# Patient Record
Sex: FEMALE | Race: BLACK OR AFRICAN AMERICAN | NOT HISPANIC OR LATINO | URBAN - METROPOLITAN AREA
[De-identification: names, ages, dates, MRNs, and addresses within clinical notes are randomized per-mention and may not be internally consistent; named-entity substitution may affect disease eponyms.]

---

## 2021-01-22 ENCOUNTER — INPATIENT (INPATIENT)
Facility: HOSPITAL | Age: 71
LOS: 2 days | Discharge: HOME | End: 2021-01-25
Attending: INTERNAL MEDICINE | Admitting: INTERNAL MEDICINE
Payer: MEDICARE

## 2021-01-22 VITALS
OXYGEN SATURATION: 97 % | SYSTOLIC BLOOD PRESSURE: 162 MMHG | TEMPERATURE: 99 F | RESPIRATION RATE: 17 BRPM | HEIGHT: 61 IN | DIASTOLIC BLOOD PRESSURE: 82 MMHG | HEART RATE: 92 BPM | WEIGHT: 188.94 LBS

## 2021-01-22 DIAGNOSIS — I10 ESSENTIAL (PRIMARY) HYPERTENSION: ICD-10-CM

## 2021-01-22 DIAGNOSIS — R07.9 CHEST PAIN, UNSPECIFIED: ICD-10-CM

## 2021-01-22 LAB
ALBUMIN SERPL ELPH-MCNC: 3.7 G/DL — SIGNIFICANT CHANGE UP (ref 3.3–5)
ALP SERPL-CCNC: 107 U/L — SIGNIFICANT CHANGE UP (ref 40–120)
ALT FLD-CCNC: 37 U/L — SIGNIFICANT CHANGE UP (ref 12–78)
ANION GAP SERPL CALC-SCNC: 4 MMOL/L — LOW (ref 5–17)
APTT BLD: 38 SEC — HIGH (ref 27.5–35.5)
AST SERPL-CCNC: 29 U/L — SIGNIFICANT CHANGE UP (ref 15–37)
BASOPHILS # BLD AUTO: 0.06 K/UL — SIGNIFICANT CHANGE UP (ref 0–0.2)
BASOPHILS NFR BLD AUTO: 0.8 % — SIGNIFICANT CHANGE UP (ref 0–2)
BILIRUB SERPL-MCNC: 0.3 MG/DL — SIGNIFICANT CHANGE UP (ref 0.2–1.2)
BUN SERPL-MCNC: 11 MG/DL — SIGNIFICANT CHANGE UP (ref 7–23)
CALCIUM SERPL-MCNC: 8.9 MG/DL — SIGNIFICANT CHANGE UP (ref 8.5–10.1)
CHLORIDE SERPL-SCNC: 107 MMOL/L — SIGNIFICANT CHANGE UP (ref 96–108)
CO2 SERPL-SCNC: 28 MMOL/L — SIGNIFICANT CHANGE UP (ref 22–31)
CREAT SERPL-MCNC: 0.84 MG/DL — SIGNIFICANT CHANGE UP (ref 0.5–1.3)
D DIMER BLD IA.RAPID-MCNC: <150 NG/ML DDU — SIGNIFICANT CHANGE UP
EOSINOPHIL # BLD AUTO: 0.1 K/UL — SIGNIFICANT CHANGE UP (ref 0–0.5)
EOSINOPHIL NFR BLD AUTO: 1.3 % — SIGNIFICANT CHANGE UP (ref 0–6)
GLUCOSE SERPL-MCNC: 110 MG/DL — HIGH (ref 70–99)
HCT VFR BLD CALC: 43 % — SIGNIFICANT CHANGE UP (ref 34.5–45)
HGB BLD-MCNC: 14.1 G/DL — SIGNIFICANT CHANGE UP (ref 11.5–15.5)
IMM GRANULOCYTES NFR BLD AUTO: 0.3 % — SIGNIFICANT CHANGE UP (ref 0–1.5)
INR BLD: 1.03 RATIO — SIGNIFICANT CHANGE UP (ref 0.88–1.16)
LIDOCAIN IGE QN: 147 U/L — SIGNIFICANT CHANGE UP (ref 73–393)
LYMPHOCYTES # BLD AUTO: 1.77 K/UL — SIGNIFICANT CHANGE UP (ref 1–3.3)
LYMPHOCYTES # BLD AUTO: 23.2 % — SIGNIFICANT CHANGE UP (ref 13–44)
MAGNESIUM SERPL-MCNC: 2.1 MG/DL — SIGNIFICANT CHANGE UP (ref 1.6–2.6)
MCHC RBC-ENTMCNC: 29.3 PG — SIGNIFICANT CHANGE UP (ref 27–34)
MCHC RBC-ENTMCNC: 32.8 GM/DL — SIGNIFICANT CHANGE UP (ref 32–36)
MCV RBC AUTO: 89.4 FL — SIGNIFICANT CHANGE UP (ref 80–100)
MONOCYTES # BLD AUTO: 0.72 K/UL — SIGNIFICANT CHANGE UP (ref 0–0.9)
MONOCYTES NFR BLD AUTO: 9.4 % — SIGNIFICANT CHANGE UP (ref 2–14)
NEUTROPHILS # BLD AUTO: 4.95 K/UL — SIGNIFICANT CHANGE UP (ref 1.8–7.4)
NEUTROPHILS NFR BLD AUTO: 65 % — SIGNIFICANT CHANGE UP (ref 43–77)
NRBC # BLD: 0 /100 WBCS — SIGNIFICANT CHANGE UP (ref 0–0)
NT-PROBNP SERPL-SCNC: 13 PG/ML — SIGNIFICANT CHANGE UP (ref 0–125)
PLATELET # BLD AUTO: 288 K/UL — SIGNIFICANT CHANGE UP (ref 150–400)
POTASSIUM SERPL-MCNC: 3.7 MMOL/L — SIGNIFICANT CHANGE UP (ref 3.5–5.3)
POTASSIUM SERPL-SCNC: 3.7 MMOL/L — SIGNIFICANT CHANGE UP (ref 3.5–5.3)
PROT SERPL-MCNC: 7.5 GM/DL — SIGNIFICANT CHANGE UP (ref 6–8.3)
PROTHROM AB SERPL-ACNC: 11.9 SEC — SIGNIFICANT CHANGE UP (ref 10.6–13.6)
RBC # BLD: 4.81 M/UL — SIGNIFICANT CHANGE UP (ref 3.8–5.2)
RBC # FLD: 14.1 % — SIGNIFICANT CHANGE UP (ref 10.3–14.5)
SODIUM SERPL-SCNC: 139 MMOL/L — SIGNIFICANT CHANGE UP (ref 135–145)
TROPONIN I SERPL-MCNC: <.015 NG/ML — SIGNIFICANT CHANGE UP (ref 0.01–0.04)
WBC # BLD: 7.62 K/UL — SIGNIFICANT CHANGE UP (ref 3.8–10.5)
WBC # FLD AUTO: 7.62 K/UL — SIGNIFICANT CHANGE UP (ref 3.8–10.5)

## 2021-01-22 PROCEDURE — 71045 X-RAY EXAM CHEST 1 VIEW: CPT | Mod: 26

## 2021-01-22 PROCEDURE — 99222 1ST HOSP IP/OBS MODERATE 55: CPT

## 2021-01-22 PROCEDURE — 99285 EMERGENCY DEPT VISIT HI MDM: CPT

## 2021-01-22 PROCEDURE — 93010 ELECTROCARDIOGRAM REPORT: CPT

## 2021-01-22 RX ORDER — HEPARIN SODIUM 5000 [USP'U]/ML
5000 INJECTION INTRAVENOUS; SUBCUTANEOUS EVERY 12 HOURS
Refills: 0 | Status: DISCONTINUED | OUTPATIENT
Start: 2021-01-22 | End: 2021-01-25

## 2021-01-22 RX ORDER — NITROGLYCERIN 6.5 MG
0.4 CAPSULE, EXTENDED RELEASE ORAL
Refills: 0 | Status: DISCONTINUED | OUTPATIENT
Start: 2021-01-22 | End: 2021-01-23

## 2021-01-22 RX ORDER — LOSARTAN POTASSIUM 100 MG/1
25 TABLET, FILM COATED ORAL DAILY
Refills: 0 | Status: DISCONTINUED | OUTPATIENT
Start: 2021-01-22 | End: 2021-01-25

## 2021-01-22 RX ORDER — ASPIRIN/CALCIUM CARB/MAGNESIUM 324 MG
162 TABLET ORAL DAILY
Refills: 0 | Status: DISCONTINUED | OUTPATIENT
Start: 2021-01-22 | End: 2021-01-23

## 2021-01-22 RX ORDER — ASPIRIN/CALCIUM CARB/MAGNESIUM 324 MG
162 TABLET ORAL ONCE
Refills: 0 | Status: COMPLETED | OUTPATIENT
Start: 2021-01-22 | End: 2021-01-22

## 2021-01-22 RX ADMIN — Medication 162 MILLIGRAM(S): at 21:51

## 2021-01-22 NOTE — H&P ADULT - PROBLEM SELECTOR PLAN 1
Nitroglycerine 0.4mg po q5min x 3 doses maximum PRN for recurrent chest pain  ASA 325mg po given  Troponin level x 2 q 3 hrs     Cardio eval in am

## 2021-01-22 NOTE — ED PROVIDER NOTE - OBJECTIVE STATEMENT
71 yo F hx HTN presenting with complaints of elevated blood pressure and intermittent chest 'discomfort' midsternal, non-radiating, for 3 days. Pain is unrelated to food intake, position and non-exertional. No associated nausea/vomiting/diaphoresis. No focal weakness/numbness/tingling. NO visual changes. No fevers/chills. No history of stress test. No cardiologist. Denies family history of CAD. 71 yo F hx HTN presenting with complaints of elevated blood pressure and intermittent chest 'discomfort' midsternal, non-radiating, for 3 days. Pain is unrelated to food intake, position. Onset is non-specific. No associated nausea/vomiting/diaphoresis. No focal weakness/numbness/tingling. NO visual changes. No fevers/chills. No history of stress test. No cardiologist. Denies family history of CAD.

## 2021-01-22 NOTE — ED PROVIDER NOTE - CLINICAL SUMMARY MEDICAL DECISION MAKING FREE TEXT BOX
69 yo F presenting with non specific chest pain, r/o ACS, r/o PE, labs including trop and d-dimer, EKG, cxr,

## 2021-01-22 NOTE — ED PROVIDER NOTE - PHYSICAL EXAMINATION
VITALS: reviewed  GEN: NAD, A & O x 4  HEAD/EYES: NCAT, EOMI, anicteric sclerae,  ENT: mucus membranes moist, oropharynx WNL, trachea midline, no JVD  RESP: lungs CTA with equal breath sounds bilaterally, chest wall nontender and atraumatic  CV: heart with reg rhythm S1, S2, distal pulses intact and symmetric bilaterally  ABDOMEN: normoactive bowel sounds, soft, nondistended, nontender, no palpable masses  : no CVAT  MSK: extremities atraumatic and nontender, no edema, no asymmetry.   SKIN: warm, dry, no rash, no bruising, no cyanosis. color appropriate for ethnicity  NEURO: alert, mentating appropriately, no facial asymmetry. gross sensation, motor, coordination are intact  PSYCH: Affect appropriate

## 2021-01-22 NOTE — ED ADULT NURSE NOTE - OBJECTIVE STATEMENT
Pt received A&OX4. Pt reports history of hypertension, she states shes been feeling dizzy at home. Highest bp was in the 150s. Denies vision change.

## 2021-01-22 NOTE — H&P ADULT - NSHPLABSRESULTS_GEN_ALL_CORE
Recent Vitals  T(C): 37.2 (01-22-21 @ 19:44), Max: 37.2 (01-22-21 @ 19:44)  HR: 85 (01-22-21 @ 19:52) (85 - 92)  BP: 159/85 (01-22-21 @ 19:52) (159/85 - 162/82)  RR: 17 (01-22-21 @ 19:44) (17 - 17)  SpO2: 97% (01-22-21 @ 19:44) (97% - 97%)                        14.1   7.62  )-----------( 288      ( 22 Jan 2021 21:32 )             43.0     01-22    139  |  107  |  11  ----------------------------<  110<H>  3.7   |  28  |  0.84    Ca    8.9      22 Jan 2021 21:32  Mg     2.1     01-22    TPro  7.5  /  Alb  3.7  /  TBili  0.3  /  DBili  x   /  AST  29  /  ALT  37  /  AlkPhos  107  01-22    PT/INR - ( 22 Jan 2021 21:32 )   PT: 11.9 sec;   INR: 1.03 ratio         PTT - ( 22 Jan 2021 21:32 )  PTT:38.0 sec  LIVER FUNCTIONS - ( 22 Jan 2021 21:32 )  Alb: 3.7 g/dL / Pro: 7.5 gm/dL / ALK PHOS: 107 U/L / ALT: 37 U/L / AST: 29 U/L / GGT: x               Home Medications:  irbesartan 75 mg oral tablet: 1 tab(s) orally once a day (22 Jan 2021 21:35)

## 2021-01-22 NOTE — ED ADULT TRIAGE NOTE - MEANS OF ARRIVAL
TRANSFER - IN REPORT:    Verbal report received from [facility] on [place]  being received from [phone] for ordered procedure      Report consisted of patients Situation, Background, Assessment and   Recommendations(SBAR). Information from the following report(s) SBAR was reviewed with the receiving nurse. Opportunity for questions and clarification was provided. Assessment completed upon patients arrival to unit and care assumed.
ambulatory

## 2021-01-22 NOTE — H&P ADULT - HISTORY OF PRESENT ILLNESS
68F with a PMH of HTN who presents to the ED for chest pain.  Patient states that for the last four days she has had mild substernal chest pain at rest.  Happens sporadically, improves without intervention.  Patient states that she also feels some lightheadedness associated with the pain but denies palpitations, diaphroesis, or dizziness.  Denies radiation of the pain.  Patient denies smoking, drinking history.  Allergic to PCN.  Patient has no other complaints.  Referred to the ED by her PMD.  Vitals stable, labs benign.  EKG shows T wave flattening.  Will admit to tele.

## 2021-01-22 NOTE — H&P ADULT - NSHPREVIEWOFSYSTEMS_GEN_ALL_CORE
Constitutional: no fever, chills, night sweats  Ears: no hearing changes or ear pain,   Nose: no nasal congestion, sinus pain, or rhinorrhea  Cardio: chest pain positive.  No orthopnea, edema, or palpitations  Resp: no dyspnea, cough, wheezing  GI: no nausea, vomiting, diarrhea, constipation, hematochezia, or melena  : no dysuria, urinary frequency, hematuria  MSK: no back pain, neck pain  Skin: no rash, pruritis   Neuro: dizziness positive.  No weakness, lightheadedness, syncope   Heme/Lymph: no bruising or bleeding

## 2021-01-23 LAB
FLUAV AG NPH QL: SIGNIFICANT CHANGE UP COUNTS
FLUBV AG NPH QL: SIGNIFICANT CHANGE UP COUNTS
HCV AB S/CO SERPL IA: 0.07 S/CO — SIGNIFICANT CHANGE UP (ref 0–0.99)
HCV AB SERPL-IMP: SIGNIFICANT CHANGE UP
RSV RNA NPH QL NAA+NON-PROBE: SIGNIFICANT CHANGE UP COUNTS
SARS-COV-2 IGG SERPL QL IA: NEGATIVE — SIGNIFICANT CHANGE UP
SARS-COV-2 IGM SERPL IA-ACNC: 0.07 INDEX — SIGNIFICANT CHANGE UP
SARS-COV-2 RNA SPEC QL NAA+PROBE: SIGNIFICANT CHANGE UP COUNTS
TROPONIN I SERPL-MCNC: <.015 NG/ML — SIGNIFICANT CHANGE UP (ref 0.01–0.04)
TROPONIN I SERPL-MCNC: <.015 NG/ML — SIGNIFICANT CHANGE UP (ref 0.01–0.04)

## 2021-01-23 PROCEDURE — 93306 TTE W/DOPPLER COMPLETE: CPT | Mod: 26

## 2021-01-23 PROCEDURE — 99232 SBSQ HOSP IP/OBS MODERATE 35: CPT

## 2021-01-23 PROCEDURE — 99223 1ST HOSP IP/OBS HIGH 75: CPT

## 2021-01-23 RX ORDER — ASPIRIN/CALCIUM CARB/MAGNESIUM 324 MG
81 TABLET ORAL DAILY
Refills: 0 | Status: DISCONTINUED | OUTPATIENT
Start: 2021-01-23 | End: 2021-01-25

## 2021-01-23 RX ORDER — SIMVASTATIN 20 MG/1
10 TABLET, FILM COATED ORAL AT BEDTIME
Refills: 0 | Status: DISCONTINUED | OUTPATIENT
Start: 2021-01-23 | End: 2021-01-25

## 2021-01-23 RX ADMIN — HEPARIN SODIUM 5000 UNIT(S): 5000 INJECTION INTRAVENOUS; SUBCUTANEOUS at 01:10

## 2021-01-23 RX ADMIN — LOSARTAN POTASSIUM 25 MILLIGRAM(S): 100 TABLET, FILM COATED ORAL at 01:10

## 2021-01-23 RX ADMIN — SIMVASTATIN 10 MILLIGRAM(S): 20 TABLET, FILM COATED ORAL at 22:24

## 2021-01-23 RX ADMIN — HEPARIN SODIUM 5000 UNIT(S): 5000 INJECTION INTRAVENOUS; SUBCUTANEOUS at 17:36

## 2021-01-23 RX ADMIN — Medication 162 MILLIGRAM(S): at 01:10

## 2021-01-23 RX ADMIN — Medication 81 MILLIGRAM(S): at 12:08

## 2021-01-23 NOTE — PATIENT PROFILE ADULT - VISION (WITH CORRECTIVE LENSES IF THE PATIENT USUALLY WEARS THEM):
One pair of reading glasses/Partially impaired: cannot see medication labels or newsprint, but can see obstacles in path, and the surrounding layout; can count fingers at arm's length

## 2021-01-23 NOTE — CONSULT NOTE ADULT - SUBJECTIVE AND OBJECTIVE BOX
Cardiology Initial Consult    [facility] - Cardiology at [place]  [address] [place] [address], [place] [address]  Office: [phone]  Fax: [phone]    All cardiology service information can be found on [email or web address], password: [email or web address]    CHIEF COMPLAINT:      HISTORY OF PRESENT ILLNESS:  70yFemale    Allergies    penicillin (Hives)    Intolerances    	    MEDICATIONS:  aspirin enteric coated 162 milliGRAM(s) Oral daily  heparin   Injectable 5000 Unit(s) SubCutaneous every 12 hours  losartan 25 milliGRAM(s) Oral daily  nitroglycerin     SubLingual 0.4 milliGRAM(s) SubLingual every 5 minutes PRN                  PAST MEDICAL & SURGICAL HISTORY:  Essential hypertension    No significant past surgical history        FAMILY HISTORY:      SOCIAL HISTORY:    [ ] Non-smoker  [ ] Smoker  [ ] Alcohol    Review of Systems:  Constitutional: [ ] Fever [ ] Chills [ ] Fatigue [ ] Weight change   HEENT: [ ] Blurred vision [ ] Eye pain [ ] Headache [ ] Runny nose [ ] Sore throat   Respiratory: [ ] Cough [ ] Wheezing [ ] Shortness of breath  Cardiovascular: [ ] Chest Pain [ ] Palpitations [ ] MARR [ ] PND [ ] Orthopnea  Gastrointestinal: [ ] Abdominal Pain [ ] Diarrhea [ ] Constipation [ ] Hemorrhoids [ ] Nausea [ ] Vomiting  Genitourinary: [ ] Nocturia [ ] Dysuria [ ] Incontinence  Extremities: [ ] Swelling [ ] Joint Pain  Neurologic: [ ] Focal deficit [ ] Paresthesias [ ] Syncope  Skin: [ ] Rash [ ] Ecchymoses [ ] Wounds [ ] Lesions  Psychiatry: [ ] Depression [ ] Suicidal/Homicidal ideation [ ] Anxiety [ ] Sleep disturbances  [ ] 10 point review of systems is otherwise negative except as mentioned above            [ ]Unable to obtain    PHYSICAL EXAM:  T(C): 37.1 (01-23-21 @ 04:00), Max: 37.2 (01-22-21 @ 19:44)  HR: 71 (01-23-21 @ 04:00) (70 - 92)  BP: 131/69 (01-23-21 @ 04:00) (131/69 - 162/82)  RR: 16 (01-23-21 @ 04:00) (15 - 18)  SpO2: 98% (01-23-21 @ 04:00) (95% - 98%)  Wt(kg): --  I&O's Summary      Appearance: No acute distress  HEENT:   Normal oral mucosa, PERRL  Cardiovascular: Normal S1 S2, no elevated JVP, no murmurs, no edema  Respiratory: Lungs clear to auscultation	, good air movement  Psychiatry: A & O x 3, Mood & affect appropriate  Gastrointestinal:  soft nt nd normoactive bs	  Skin: No rashes, no ecchymoses, no cyanosis	  Neurologic: grossly non-focal  Extremities: Normal range of motion, no clubbing, cyanosis or edema  Vascular: Peripheral pulses palpable bilaterally    LABS:	 	  CBC Full  -  ( 22 Jan 2021 21:32 )  WBC Count : 7.62 K/uL  Hemoglobin : 14.1 g/dL  Hematocrit : 43.0 %  Platelet Count - Automated : 288 K/uL  Mean Cell Volume : 89.4 fl  Mean Cell Hemoglobin : 29.3 pg  Mean Cell Hemoglobin Concentration : 32.8 gm/dL  Auto Neutrophil # : 4.95 K/uL  Auto Lymphocyte # : 1.77 K/uL  Auto Monocyte # : 0.72 K/uL  Auto Eosinophil # : 0.10 K/uL  Auto Basophil # : 0.06 K/uL  Auto Neutrophil % : 65.0 %  Auto Lymphocyte % : 23.2 %  Auto Monocyte % : 9.4 %  Auto Eosinophil % : 1.3 %  Auto Basophil % : 0.8 %    01-22    139  |  107  |  11  ----------------------------<  110<H>  3.7   |  28  |  0.84    Ca    8.9      22 Jan 2021 21:32  Mg     2.1     01-22    TPro  7.5  /  Alb  3.7  /  TBili  0.3  /  DBili  x   /  AST  29  /  ALT  37  /  AlkPhos  107  01-22      proBNP: Serum Pro-Brain Natriuretic Peptide: 13 pg/mL (01-22 @ 21:32)    Lipid Profile:   HgA1c:   TSH:     CARDIAC MARKERS:  Troponin I, Serum: <.015 ng/mL (01-23 @ 05:02)  Troponin I, Serum: <.015 ng/mL (01-23 @ 00:48)  Troponin I, Serum: <.015 ng/mL (01-22 @ 21:32)              TELEMETRY: 	    ECG:  	  RADIOLOGY:  OTHER: 	    PREVIOUS DIAGNOSTIC TESTING:    [ ] Echocardiogram:   [ ] Catheterization:   [ ] Stress Test:  	 Cardiology Initial Consult    [facility] - Cardiology at [place]  [address] [place] [address], [place] [address]  Office: [phone]  Fax: [phone]    CHIEF COMPLAINT: chest pain      HISTORY OF PRESENT ILLNESS:  70F HTN with hx of several days of intermittent chest pain. Not positional, though does report some mild dizziness or feeling of unwell. Some increase in MARR.  Currently asymptomatic.    ECG with nonspecific t wave flattening.  She has no hx of similar symptoms.      Allergies    penicillin (Hives)  Intolerances      MEDICATIONS:  aspirin enteric coated 162 milliGRAM(s) Oral daily  heparin   Injectable 5000 Unit(s) SubCutaneous every 12 hours  losartan 25 milliGRAM(s) Oral daily  nitroglycerin     SubLingual 0.4 milliGRAM(s) SubLingual every 5 minutes PRN      PAST MEDICAL & SURGICAL HISTORY:  Essential hypertension    No significant past surgical history    FAMILY HISTORY:    SOCIAL HISTORY:    [ x] Non-smoker  [ ] Smoker  [ ] Alcohol    Review of Systems:  Constitutional: [- ] Fever [- ] Chills [ ] Fatigue [ ] Weight change   HEENT: [ -] Blurred vision [ -] Eye pain [ ] Headache [ ] Runny nose [ ] Sore throat   Respiratory: [- ] Cough [ ] Wheezing [ ] Shortness of breath  Cardiovascular: [x ] Chest Pain [ ] Palpitations [x ] MARR [ ] PND [- ] Orthopnea  Gastrointestinal: [- ] Abdominal Pain [ -] Diarrhea [ ] Constipation [ ] Hemorrhoids [ ] Nausea [ ] Vomiting  Genitourinary: [ -] Nocturia [ ] Dysuria [ ] Incontinence  Extremities: [ -] Swelling [ ] Joint Pain  Neurologic: [- ] Focal deficit [ ] Paresthesias [ ] Syncope  Skin: [- ] Rash [ ] Ecchymoses [ ] Wounds [ ] Lesions  Psychiatry: [ -] Depression [ ] Suicidal/Homicidal ideation [ ] Anxiety [ ] Sleep disturbances  [x ] 10 point review of systems is otherwise negative except as mentioned above            [ ]Unable to obtain    PHYSICAL EXAM:  T(C): 37.1 (01-23-21 @ 04:00), Max: 37.2 (01-22-21 @ 19:44)  HR: 71 (01-23-21 @ 04:00) (70 - 92)  BP: 131/69 (01-23-21 @ 04:00) (131/69 - 162/82)  RR: 16 (01-23-21 @ 04:00) (15 - 18)  SpO2: 98% (01-23-21 @ 04:00) (95% - 98%)  Wt(kg): --  I&O's Summary      Appearance: No acute distress  HEENT:   mmm  Cardiovascular: Normal S1 S2, 1/6 systolic murmur RUSB, no elevated JVP,no edema  Respiratory: Lungs clear to auscultation	, good air movement  Psychiatry: A & O x 3, Mood & affect appropriate  Gastrointestinal:  soft nt nd normoactive bs	  Skin: No rashes, no ecchymoses, no cyanosis	  Neurologic: grossly non-focal  Extremities: Normal range of motion, no clubbing, cyanosis or edema  Vascular: Peripheral pulses palpable bilaterally    LABS:	 	  CBC Full  -  ( 22 Jan 2021 21:32 )  WBC Count : 7.62 K/uL  Hemoglobin : 14.1 g/dL  Hematocrit : 43.0 %  Platelet Count - Automated : 288 K/uL      01-22  139  |  107  |  11  ----------------------------<  110<H>  3.7   |  28  |  0.84    Ca    8.9      22 Jan 2021 21:32  Mg     2.1     01-22    TPro  7.5  /  Alb  3.7  /  TBili  0.3  /  DBili  x   /  AST  29  /  ALT  37  /  AlkPhos  107  01-22    proBNP: Serum Pro-Brain Natriuretic Peptide: 13 pg/mL (01-22 @ 21:32)    Lipid Profile:   HgA1c:   TSH:     CARDIAC MARKERS:  Troponin I, Serum: <.015 ng/mL (01-23 @ 05:02)  Troponin I, Serum: <.015 ng/mL (01-23 @ 00:48)  Troponin I, Serum: <.015 ng/mL (01-22 @ 21:32)      TELEMETRY: 	    ECG:  	SR, non spec t wave flattening  RADIOLOGY:  OTHER: 	    PREVIOUS DIAGNOSTIC TESTING:    [ ] Echocardiogram:    [ ] Catheterization:   [ ] Stress Test:

## 2021-01-23 NOTE — CONSULT NOTE ADULT - ASSESSMENT
70F HTN with atypical chest pain however with some features concerning for possible CAD. Her BP was elevated during intake. Cardiac risk enhancers include hx of HTN, age, and postmenopausal state. Differential also includes a cardiac arrhythmia leading to dizziness and MARR.    -check TTE  -check nuclear stress test  -cont ARB for afterload reduction    discussed with pt who agrees with the plan

## 2021-01-23 NOTE — PATIENT PROFILE ADULT - STATED REASON FOR ADMISSION
I was feeling lightheaded and my blood pressure was high for several days. I started hasving pain in my back and chest.

## 2021-01-23 NOTE — ED ADULT NURSE REASSESSMENT NOTE - NS ED NURSE REASSESS COMMENT FT1
covering primary nurse Felipa. pt on the bed alert and oriented. pt identified self introduced. pt for admission. safety measures checked.

## 2021-01-24 LAB
ANION GAP SERPL CALC-SCNC: 8 MMOL/L — SIGNIFICANT CHANGE UP (ref 5–17)
BUN SERPL-MCNC: 11 MG/DL — SIGNIFICANT CHANGE UP (ref 7–23)
CALCIUM SERPL-MCNC: 8.8 MG/DL — SIGNIFICANT CHANGE UP (ref 8.5–10.1)
CHLORIDE SERPL-SCNC: 106 MMOL/L — SIGNIFICANT CHANGE UP (ref 96–108)
CHOLEST SERPL-MCNC: 153 MG/DL — SIGNIFICANT CHANGE UP
CO2 SERPL-SCNC: 26 MMOL/L — SIGNIFICANT CHANGE UP (ref 22–31)
CREAT SERPL-MCNC: 0.73 MG/DL — SIGNIFICANT CHANGE UP (ref 0.5–1.3)
GLUCOSE SERPL-MCNC: 83 MG/DL — SIGNIFICANT CHANGE UP (ref 70–99)
HCT VFR BLD CALC: 40.6 % — SIGNIFICANT CHANGE UP (ref 34.5–45)
HDLC SERPL-MCNC: 46 MG/DL — LOW
HGB BLD-MCNC: 13.4 G/DL — SIGNIFICANT CHANGE UP (ref 11.5–15.5)
LIPID PNL WITH DIRECT LDL SERPL: 92 MG/DL — SIGNIFICANT CHANGE UP
MCHC RBC-ENTMCNC: 29.5 PG — SIGNIFICANT CHANGE UP (ref 27–34)
MCHC RBC-ENTMCNC: 33 GM/DL — SIGNIFICANT CHANGE UP (ref 32–36)
MCV RBC AUTO: 89.4 FL — SIGNIFICANT CHANGE UP (ref 80–100)
NON HDL CHOLESTEROL: 107 MG/DL — SIGNIFICANT CHANGE UP
NRBC # BLD: 0 /100 WBCS — SIGNIFICANT CHANGE UP (ref 0–0)
PLATELET # BLD AUTO: 269 K/UL — SIGNIFICANT CHANGE UP (ref 150–400)
POTASSIUM SERPL-MCNC: 3.8 MMOL/L — SIGNIFICANT CHANGE UP (ref 3.5–5.3)
POTASSIUM SERPL-SCNC: 3.8 MMOL/L — SIGNIFICANT CHANGE UP (ref 3.5–5.3)
RBC # BLD: 4.54 M/UL — SIGNIFICANT CHANGE UP (ref 3.8–5.2)
RBC # FLD: 14.1 % — SIGNIFICANT CHANGE UP (ref 10.3–14.5)
SODIUM SERPL-SCNC: 140 MMOL/L — SIGNIFICANT CHANGE UP (ref 135–145)
TRIGL SERPL-MCNC: 74 MG/DL — SIGNIFICANT CHANGE UP
WBC # BLD: 5.98 K/UL — SIGNIFICANT CHANGE UP (ref 3.8–10.5)
WBC # FLD AUTO: 5.98 K/UL — SIGNIFICANT CHANGE UP (ref 3.8–10.5)

## 2021-01-24 PROCEDURE — 99232 SBSQ HOSP IP/OBS MODERATE 35: CPT

## 2021-01-24 RX ADMIN — SIMVASTATIN 10 MILLIGRAM(S): 20 TABLET, FILM COATED ORAL at 21:29

## 2021-01-24 RX ADMIN — LOSARTAN POTASSIUM 25 MILLIGRAM(S): 100 TABLET, FILM COATED ORAL at 06:26

## 2021-01-24 RX ADMIN — HEPARIN SODIUM 5000 UNIT(S): 5000 INJECTION INTRAVENOUS; SUBCUTANEOUS at 17:56

## 2021-01-24 RX ADMIN — Medication 81 MILLIGRAM(S): at 12:10

## 2021-01-24 RX ADMIN — HEPARIN SODIUM 5000 UNIT(S): 5000 INJECTION INTRAVENOUS; SUBCUTANEOUS at 06:26

## 2021-01-25 ENCOUNTER — TRANSCRIPTION ENCOUNTER (OUTPATIENT)
Age: 71
End: 2021-01-25

## 2021-01-25 VITALS
HEART RATE: 83 BPM | OXYGEN SATURATION: 97 % | DIASTOLIC BLOOD PRESSURE: 81 MMHG | TEMPERATURE: 100 F | RESPIRATION RATE: 18 BRPM | SYSTOLIC BLOOD PRESSURE: 132 MMHG

## 2021-01-25 LAB
ANION GAP SERPL CALC-SCNC: 4 MMOL/L — LOW (ref 5–17)
BASOPHILS # BLD AUTO: 0.04 K/UL — SIGNIFICANT CHANGE UP (ref 0–0.2)
BASOPHILS NFR BLD AUTO: 0.6 % — SIGNIFICANT CHANGE UP (ref 0–2)
BUN SERPL-MCNC: 10 MG/DL — SIGNIFICANT CHANGE UP (ref 7–23)
CALCIUM SERPL-MCNC: 8.4 MG/DL — LOW (ref 8.5–10.1)
CHLORIDE SERPL-SCNC: 109 MMOL/L — HIGH (ref 96–108)
CO2 SERPL-SCNC: 28 MMOL/L — SIGNIFICANT CHANGE UP (ref 22–31)
CREAT SERPL-MCNC: 0.76 MG/DL — SIGNIFICANT CHANGE UP (ref 0.5–1.3)
EOSINOPHIL # BLD AUTO: 0.17 K/UL — SIGNIFICANT CHANGE UP (ref 0–0.5)
EOSINOPHIL NFR BLD AUTO: 2.4 % — SIGNIFICANT CHANGE UP (ref 0–6)
GLUCOSE SERPL-MCNC: 85 MG/DL — SIGNIFICANT CHANGE UP (ref 70–99)
HCT VFR BLD CALC: 39.3 % — SIGNIFICANT CHANGE UP (ref 34.5–45)
HGB BLD-MCNC: 12.7 G/DL — SIGNIFICANT CHANGE UP (ref 11.5–15.5)
IMM GRANULOCYTES NFR BLD AUTO: 0.4 % — SIGNIFICANT CHANGE UP (ref 0–1.5)
LYMPHOCYTES # BLD AUTO: 2.74 K/UL — SIGNIFICANT CHANGE UP (ref 1–3.3)
LYMPHOCYTES # BLD AUTO: 38.8 % — SIGNIFICANT CHANGE UP (ref 13–44)
MAGNESIUM SERPL-MCNC: 1.8 MG/DL — SIGNIFICANT CHANGE UP (ref 1.6–2.6)
MCHC RBC-ENTMCNC: 28.9 PG — SIGNIFICANT CHANGE UP (ref 27–34)
MCHC RBC-ENTMCNC: 32.3 GM/DL — SIGNIFICANT CHANGE UP (ref 32–36)
MCV RBC AUTO: 89.5 FL — SIGNIFICANT CHANGE UP (ref 80–100)
MONOCYTES # BLD AUTO: 0.69 K/UL — SIGNIFICANT CHANGE UP (ref 0–0.9)
MONOCYTES NFR BLD AUTO: 9.8 % — SIGNIFICANT CHANGE UP (ref 2–14)
NEUTROPHILS # BLD AUTO: 3.39 K/UL — SIGNIFICANT CHANGE UP (ref 1.8–7.4)
NEUTROPHILS NFR BLD AUTO: 48 % — SIGNIFICANT CHANGE UP (ref 43–77)
NRBC # BLD: 0 /100 WBCS — SIGNIFICANT CHANGE UP (ref 0–0)
PHOSPHATE SERPL-MCNC: 3.3 MG/DL — SIGNIFICANT CHANGE UP (ref 2.5–4.5)
PLATELET # BLD AUTO: 259 K/UL — SIGNIFICANT CHANGE UP (ref 150–400)
POTASSIUM SERPL-MCNC: 3.8 MMOL/L — SIGNIFICANT CHANGE UP (ref 3.5–5.3)
POTASSIUM SERPL-SCNC: 3.8 MMOL/L — SIGNIFICANT CHANGE UP (ref 3.5–5.3)
RBC # BLD: 4.39 M/UL — SIGNIFICANT CHANGE UP (ref 3.8–5.2)
RBC # FLD: 13.9 % — SIGNIFICANT CHANGE UP (ref 10.3–14.5)
SODIUM SERPL-SCNC: 141 MMOL/L — SIGNIFICANT CHANGE UP (ref 135–145)
WBC # BLD: 7.06 K/UL — SIGNIFICANT CHANGE UP (ref 3.8–10.5)
WBC # FLD AUTO: 7.06 K/UL — SIGNIFICANT CHANGE UP (ref 3.8–10.5)

## 2021-01-25 PROCEDURE — 99232 SBSQ HOSP IP/OBS MODERATE 35: CPT

## 2021-01-25 PROCEDURE — 99239 HOSP IP/OBS DSCHRG MGMT >30: CPT

## 2021-01-25 PROCEDURE — 93018 CV STRESS TEST I&R ONLY: CPT

## 2021-01-25 RX ORDER — REGADENOSON 0.08 MG/ML
0.4 INJECTION, SOLUTION INTRAVENOUS ONCE
Refills: 0 | Status: COMPLETED | OUTPATIENT
Start: 2021-01-25 | End: 2021-01-25

## 2021-01-25 RX ADMIN — Medication 81 MILLIGRAM(S): at 13:25

## 2021-01-25 RX ADMIN — REGADENOSON 0.4 MILLIGRAM(S): 0.08 INJECTION, SOLUTION INTRAVENOUS at 11:28

## 2021-01-25 RX ADMIN — HEPARIN SODIUM 5000 UNIT(S): 5000 INJECTION INTRAVENOUS; SUBCUTANEOUS at 05:30

## 2021-01-25 NOTE — DISCHARGE NOTE NURSING/CASE MANAGEMENT/SOCIAL WORK - PATIENT PORTAL LINK FT
You can access the FollowMyHealth Patient Portal offered by [facility] by registering at the following website: [email or web address]. By joining [facility]’s FollowMyHealth portal, you will also be able to view your health information using other applications (apps) compatible with our system.

## 2021-01-25 NOTE — PROGRESS NOTE ADULT - SUBJECTIVE AND OBJECTIVE BOX
Cardiology Progress Note    [facility] - Cardiology at [place]  [address] [place] [address], [place] [address]  Office: [phone]  Fax: [phone]    Interval Events:  No further chest pain    MEDICATIONS:  aspirin enteric coated 81 milliGRAM(s) Oral daily  heparin   Injectable 5000 Unit(s) SubCutaneous every 12 hours  losartan 25 milliGRAM(s) Oral daily  simvastatin 10 milliGRAM(s) Oral at bedtime    Review of Systems:  Constitutional: [- ] Fever [- ] Chills [ ] Fatigue [ ] Weight change   HEENT: [ -] Blurred vision [ -] Eye pain [ ] Headache [ ] Runny nose [ ] Sore throat   Respiratory: [- ] Cough [ ] Wheezing [ ] Shortness of breath  Cardiovascular: [x ] Chest Pain [ ] Palpitations [x ] MARR [ ] PND [- ] Orthopnea  Gastrointestinal: [- ] Abdominal Pain [ -] Diarrhea [ ] Constipation [ ] Hemorrhoids [ ] Nausea [ ] Vomiting  Genitourinary: [ -] Nocturia [ ] Dysuria [ ] Incontinence  Extremities: [ -] Swelling [ ] Joint Pain  Neurologic: [- ] Focal deficit [ ] Paresthesias [ ] Syncope  Skin: [- ] Rash [ ] Ecchymoses [ ] Wounds [ ] Lesions  Psychiatry: [ -] Depression [ ] Suicidal/Homicidal ideation [ ] Anxiety [ ] Sleep disturbances  [x ] 10 point review of systems is otherwise negative except as mentioned above            [ ]Unable to obtain    PHYSICAL EXAM:  T(C): 36.9 (01-25-21 @ 04:12), Max: 37.2 (01-24-21 @ 16:14)  HR: 71 (01-25-21 @ 04:12) (66 - 75)  BP: 111/71 (01-25-21 @ 04:12) (108/70 - 130/79)  RR: 18 (01-25-21 @ 04:12) (18 - 18)  SpO2: 96% (01-25-21 @ 04:12) (96% - 98%)  Wt(kg): --  I&O's Summary    24 Jan 2021 07:01  -  25 Jan 2021 07:00  --------------------------------------------------------  IN: 960 mL / OUT: 0 mL / NET: 960 mL    Appearance: No acute distress  HEENT:   mmm  Cardiovascular: Normal S1 S2, 1/6 systolic murmur RUSB, no elevated JVP,no edema  Respiratory: Lungs clear to auscultation	, good air movement  Psychiatry: A & O x 3, Mood & affect appropriate  Gastrointestinal:  soft nt nd normoactive bs	  Skin: No rashes, no ecchymoses, no cyanosis	  Neurologic: grossly non-focal  Extremities: Normal range of motion, no clubbing, cyanosis or edema  Vascular: Peripheral pulses palpable bilaterally    LABS:	 	  CBC Full  -  ( 25 Jan 2021 02:46 )  WBC Count : 7.06 K/uL  Hemoglobin : 12.7 g/dL  Hematocrit : 39.3 %  Platelet Count - Automated : 259 K/uL    01-25  141  |  109<H>  |  10  ----------------------------<  85  3.8   |  28  |  0.76    01-24  140  |  106  |  11  ----------------------------<  83  3.8   |  26  |  0.73    Ca    8.4<L>      25 Jan 2021 02:46  Ca    8.8      24 Jan 2021 08:24  Phos  3.3     01-25  Mg     1.8     01-25        proBNP:   Lipid Profile:   HgA1c:   TSH:     CARDIAC MARKERS:  Troponin I, Serum: <.015 ng/mL (01-23 @ 05:02)  Troponin I, Serum: <.015 ng/mL (01-23 @ 00:48)  Troponin I, Serum: <.015 ng/mL (01-22 @ 21:32)      PREVIOUS DIAGNOSTIC TESTING:    [ ] Echocardiogram:   [ ] Catheterization:   [x] Stress Test:  	< from: NM Pharm Stress Test, Dual Isotope (01.25.21 @ 12:41) >  IMPRESSION: Normal myocardial perfusion scan.    1. No definitve scintigraphic evidence for myocardial infarct or ischemia.    2. There is normal left ventricular contractility, normal calculated ejection fraction and normal myocardial thickening at rest. Overall post stress ejectionfraction was 74%    3. Please see the cardiac test report for EKG findings and symptoms during the procedure    < end of copied text >  < from: NM Pharm Stress Test, Dual Isotope (01.25.21 @ 12:41) >  Resting:   Time: 0 minutes  BP: 153/75    HR: 77    Stage 1: Time: 1 minute  BP: 155/70    HR: 100    Comments: Isotope injected    Stage 2: Time: 2 minutes  BP: 126/75    HR: 110    Comments: No complications or complaints    Stage 3: Time: 3 minutes  BP: 142/66    HR: 107    Reason for cessation: Completion of the Lexiscan protocol    Resting EKG: Normal sinus rhythm    Stress EKG: No new EKG changes    Brief description: 70-year-old female with chest pain    Impression: No new EKG changes, myocardial perfusion scan and results are pending.    < end of copied text >  
Cardiology Progress Note    [facility] - Cardiology at [place]  [address] [place] [address], [place] [address]  Office: [phone]  Fax: [phone]    Interval Events:  no recurrent CP    MEDICATIONS:  aspirin enteric coated 81 milliGRAM(s) Oral daily  heparin   Injectable 5000 Unit(s) SubCutaneous every 12 hours  losartan 25 milliGRAM(s) Oral daily  simvastatin 10 milliGRAM(s) Oral at bedtime      Review of Systems:  Constitutional: [- ] Fever [- ] Chills [ ] Fatigue [ ] Weight change   HEENT: [ -] Blurred vision [ -] Eye pain [ ] Headache [ ] Runny nose [ ] Sore throat   Respiratory: [- ] Cough [ ] Wheezing [ ] Shortness of breath  Cardiovascular: [x ] Chest Pain [ ] Palpitations [x ] MARR [ ] PND [- ] Orthopnea  Gastrointestinal: [- ] Abdominal Pain [ -] Diarrhea [ ] Constipation [ ] Hemorrhoids [ ] Nausea [ ] Vomiting  Genitourinary: [ -] Nocturia [ ] Dysuria [ ] Incontinence  Extremities: [ -] Swelling [ ] Joint Pain  Neurologic: [- ] Focal deficit [ ] Paresthesias [ ] Syncope  Skin: [- ] Rash [ ] Ecchymoses [ ] Wounds [ ] Lesions  Psychiatry: [ -] Depression [ ] Suicidal/Homicidal ideation [ ] Anxiety [ ] Sleep disturbances  [x ] 10 point review of systems is otherwise negative except as mentioned above            [ ]Unable to obtain      PHYSICAL EXAM:  T(C): 36.9 (01-24-21 @ 11:00), Max: 37.2 (01-23-21 @ 15:58)  HR: 74 (01-24-21 @ 11:00) (64 - 80)  BP: 129/74 (01-24-21 @ 11:00) (122/80 - 141/82)  RR: 18 (01-24-21 @ 11:00) (14 - 18)  SpO2: 96% (01-24-21 @ 11:00) (96% - 99%)  Wt(kg): --  I&O's Summary    Appearance: No acute distress  HEENT:   mmm  Cardiovascular: Normal S1 S2, 1/6 systolic murmur RUSB, no elevated JVP,no edema  Respiratory: Lungs clear to auscultation	, good air movement  Psychiatry: A & O x 3, Mood & affect appropriate  Gastrointestinal:  soft nt nd normoactive bs	  Skin: No rashes, no ecchymoses, no cyanosis	  Neurologic: grossly non-focal  Extremities: Normal range of motion, no clubbing, cyanosis or edema  Vascular: Peripheral pulses palpable bilaterally    LABS:	 	  CBC Full  -  ( 24 Jan 2021 08:24 )  WBC Count : 5.98 K/uL  Hemoglobin : 13.4 g/dL  Hematocrit : 40.6 %  Platelet Count - Automated : 269 K/uL      01-24    140  |  106  |  11  ----------------------------<  83  3.8   |  26  |  0.73  01-22    139  |  107  |  11  ----------------------------<  110<H>  3.7   |  28  |  0.84    Ca    8.8      24 Jan 2021 08:24  Ca    8.9      22 Jan 2021 21:32  Mg     2.1     01-22    TPro  7.5  /  Alb  3.7  /  TBili  0.3  /  DBili  x   /  AST  29  /  ALT  37  /  AlkPhos  107  01-22      proBNP: Serum Pro-Brain Natriuretic Peptide: 13 pg/mL (01-22 @ 21:32)    Lipid Profile:   HgA1c:   TSH:     CARDIAC MARKERS:  Troponin I, Serum: <.015 ng/mL (01-23 @ 05:02)  Troponin I, Serum: <.015 ng/mL (01-23 @ 00:48)  Troponin I, Serum: <.015 ng/mL (01-22 @ 21:32)      TELEMETRY: 	  SR  ECG:  	  RADIOLOGY:  OTHER: 	    PREVIOUS DIAGNOSTIC TESTING:    [x] Echocardiogram:   < from: TTE Echo Complete w/o Contrast w/ Doppler (01.23.21 @ 12:41) >  Summary:   1. Left ventricular ejection fraction, by visual estimation, is 60 to 65%.   2. Technically suboptimal study.   3. Normal global left ventricular systolic function.   4. Normal left ventricular internal cavity size.   5. Spectral Doppler shows impaired relaxation pattern of left ventricular myocardial filling (Grade I diastolic dysfunction).   6. Normal right ventricular size and function.   7. Normal left atrial size.   8. Normal right atrial size.   9. There is no evidence of pericardial effusion.  10. Structurally normal mitral valve, with normal leaflet excursion.  11. Tracemitral valve regurgitation.  12. Sclerotic aortic valve with decreased opening.    < end of copied text >    [ ] Catheterization:   [ ] Stress Test:  	
Patient is a 70y old  Female who presents with a chief complaint of CP (23 Jan 2021 11:13)      INTERVAL HPI/OVERNIGHT EVENTS: no events     MEDICATIONS  (STANDING):  aspirin enteric coated 81 milliGRAM(s) Oral daily  heparin   Injectable 5000 Unit(s) SubCutaneous every 12 hours  losartan 25 milliGRAM(s) Oral daily  simvastatin 10 milliGRAM(s) Oral at bedtime    MEDICATIONS  (PRN):      Allergies    penicillin (Hives)    Intolerances       Vital Signs Last 24 Hrs  T(C): 36.5 (24 Jan 2021 04:19), Max: 37.2 (23 Jan 2021 15:58)  T(F): 97.7 (24 Jan 2021 04:19), Max: 99 (23 Jan 2021 15:58)  HR: 72 (24 Jan 2021 04:19) (64 - 80)  BP: 126/78 (24 Jan 2021 04:19) (122/80 - 141/82)  BP(mean): --  RR: 18 (24 Jan 2021 04:19) (14 - 18)  SpO2: 98% (24 Jan 2021 04:19) (97% - 99%)    PHYSICAL EXAM:  GENERAL: NAD, well-groomed, well-developed  HEAD:  Atraumatic, Normocephalic  EYES: EOMI, PERRLA, conjunctiva and sclera clear  ENMT: No tonsillar erythema, exudates, or enlargement; Moist mucous membranes, Good dentition, No lesions  NECK: Supple, No JVD, Normal thyroid  NERVOUS SYSTEM:  Alert & Oriented X3, Good concentration; Motor Strength 5/5 B/L upper and lower extremities; DTRs 2+ intact and symmetric  CHEST/LUNG: Clear to percussion bilaterally; No rales, rhonchi, wheezing, or rubs  HEART: Regular rate and rhythm; No murmurs, rubs, or gallops  ABDOMEN: Soft, Nontender, Nondistended; Bowel sounds present  EXTREMITIES:  2+ Peripheral Pulses, No clubbing, cyanosis, or edema  LYMPH: No lymphadenopathy noted  SKIN: No rashes or lesions    LABS:                        13.4   5.98  )-----------( 269      ( 24 Jan 2021 08:24 )             40.6     01-24    140  |  106  |  11  ----------------------------<  83  3.8   |  26  |  0.73    Ca    8.8      24 Jan 2021 08:24  Mg     2.1     01-22    TPro  7.5  /  Alb  3.7  /  TBili  0.3  /  DBili  x   /  AST  29  /  ALT  37  /  AlkPhos  107  01-22    PT/INR - ( 22 Jan 2021 21:32 )   PT: 11.9 sec;   INR: 1.03 ratio         PTT - ( 22 Jan 2021 21:32 )  PTT:38.0 sec    CAPILLARY BLOOD GLUCOSE          RADIOLOGY & ADDITIONAL TESTS:    Imaging Personally Reviewed:  [ X] YES  [ ] NO    Consultant(s) Notes Reviewed:  [ X] YES  [ ] NO    Care Discussed with Consultants/Other Providers [X ] YES  [ ] NO
 no cp/sob    REVIEW OF SYSTEMS:  GEN: no fever,    no chills  RESP: no SOB,   no cough  CVS: no chest pain,   no palpitations  GI: no abdominal pain,   no nausea,   no vomiting,   no constipation,   no diarrhea  : no dysuria,   no frequency  NEURO: no headache,   no dizziness  PSYCH: no depression,   not anxious  Derm : no rash    MEDICATIONS  (STANDING):  aspirin enteric coated 162 milliGRAM(s) Oral daily  heparin   Injectable 5000 Unit(s) SubCutaneous every 12 hours  losartan 25 milliGRAM(s) Oral daily    MEDICATIONS  (PRN):  nitroglycerin     SubLingual 0.4 milliGRAM(s) SubLingual every 5 minutes PRN Chest Pain      Vital Signs Last 24 Hrs  T(C): 37.1 (23 Jan 2021 04:00), Max: 37.2 (22 Jan 2021 19:44)  T(F): 98.8 (23 Jan 2021 04:00), Max: 98.9 (22 Jan 2021 19:44)  HR: 71 (23 Jan 2021 04:00) (70 - 92)  BP: 131/69 (23 Jan 2021 04:00) (131/69 - 162/82)  BP(mean): --  RR: 16 (23 Jan 2021 04:00) (15 - 18)  SpO2: 98% (23 Jan 2021 04:00) (95% - 98%)  CAPILLARY BLOOD GLUCOSE        I&O's Summary      PHYSICAL EXAM:  HEAD:  Atraumatic, Normocephalic  NECK: Supple, No   JVD  CHEST/LUNG:   no     rales,     no,    rhonchi  HEART: Regular rate and rhythm;         murmur  ABDOMEN: Soft, Nontender, ;   EXTREMITIES:    no    edema  NEUROLOGY:  alert    LABS:                        14.1   7.62  )-----------( 288      ( 22 Jan 2021 21:32 )             43.0     01-22    139  |  107  |  11  ----------------------------<  110<H>  3.7   |  28  |  0.84    Ca    8.9      22 Jan 2021 21:32  Mg     2.1     01-22    TPro  7.5  /  Alb  3.7  /  TBili  0.3  /  DBili  x   /  AST  29  /  ALT  37  /  AlkPhos  107  01-22    PT/INR - ( 22 Jan 2021 21:32 )   PT: 11.9 sec;   INR: 1.03 ratio         PTT - ( 22 Jan 2021 21:32 )  PTT:38.0 sec  CARDIAC MARKERS ( 23 Jan 2021 05:02 )  <.015 ng/mL / x     / x     / x     / x      CARDIAC MARKERS ( 23 Jan 2021 00:48 )  <.015 ng/mL / x     / x     / x     / x      CARDIAC MARKERS ( 22 Jan 2021 21:32 )  <.015 ng/mL / x     / x     / x     / x                            Consultant(s) Notes Reviewed:      Care Discussed with Consultants/Other Providers:

## 2021-01-25 NOTE — DISCHARGE NOTE PROVIDER - HOSPITAL COURSE
68F h/o HTN who presents to the ED for chest pain.       Chest pain:  - ACS ruled out  - Stress test neg  - Stable for Dc    HTN:  - Resume home med     Obesity:  - Wt loss

## 2021-01-25 NOTE — PROGRESS NOTE ADULT - ASSESSMENT
68F      h/o HTN        who presents to the ED for chest pain.      Patient states that for the last four days she has had mild substernal chest pain at rest., with no radiation       denies palpitations, diaphoresis  or dizziness.  Denies radiation of the pain.      Patient denies smoking, drinking history.  Allergic to PCN.      1. CP on arrival   none  today/ no sob       normal troponin    tele monitoring    on asa  81  mg qd and statin/ zocor  10 mg   seen by card dr frost,   recommended  stress test/  echo   2. HTN, on  cozaar   3. Obesity,  BMI is 35.7   on dvt ppx    
68F      h/o HTN        who presents to the ED for chest pain.      Patient states that for the last four days she has had mild substernal chest pain at rest., with no radiation       denies palpitations, diaphoresis  or dizziness.  Denies radiation of the pain.      Patient denies smoking, drinking history.  Allergic to PCN.      1. CP on arrival   none  today/ no sob       normal troponin    tele monitoring    on asa  81  mg qd and statin/ zocor  10 mg   seen by dr santosh galvin,   echo reviewed, STRESS TEST IN AM    2. HTN, on  cozaar   3. Obesity,  BMI is 35.7   on dvt ppx    
70F HTN with atypical chest pain however with some features concerning for possible CAD. Her BP was elevated during intake. Cardiac risk enhancers include hx of HTN, age, and postmenopausal state. Differential also includes a cardiac arrhythmia leading to dizziness and MARR.    TTE with nl LVEF, no WMA, DD1  -check nuclear stress test  -cont ARB for afterload reduction
70F HTN with atypical chest pain however with some features concerning for possible CAD. Her BP was elevated during intake. Cardiac risk enhancers include hx of HTN, age, and postmenopausal state.    TTE with nl LVEF, no WMA, DD1  Nuclear stress test without evidence of inducible ischemia  okay to discharge from cardiac standpoint

## 2021-01-25 NOTE — DISCHARGE NOTE PROVIDER - NSDCCPCAREPLAN_GEN_ALL_CORE_FT
PRINCIPAL DISCHARGE DIAGNOSIS  Diagnosis: Chest pain  Assessment and Plan of Treatment: negative stress test      SECONDARY DISCHARGE DIAGNOSES  Diagnosis: Essential hypertension  Assessment and Plan of Treatment: continue home med

## 2021-01-30 DIAGNOSIS — R07.9 CHEST PAIN, UNSPECIFIED: ICD-10-CM

## 2021-01-30 DIAGNOSIS — E66.9 OBESITY, UNSPECIFIED: ICD-10-CM

## 2021-01-30 DIAGNOSIS — I10 ESSENTIAL (PRIMARY) HYPERTENSION: ICD-10-CM

## 2021-04-19 ENCOUNTER — LABORATORY RESULT (OUTPATIENT)
Age: 71
End: 2021-04-19

## 2021-04-19 ENCOUNTER — APPOINTMENT (OUTPATIENT)
Dept: OTOLARYNGOLOGY | Facility: CLINIC | Age: 71
End: 2021-04-19
Payer: MEDICARE

## 2021-04-19 ENCOUNTER — TRANSCRIPTION ENCOUNTER (OUTPATIENT)
Age: 71
End: 2021-04-19

## 2021-04-19 DIAGNOSIS — Z86.79 PERSONAL HISTORY OF OTHER DISEASES OF THE CIRCULATORY SYSTEM: ICD-10-CM

## 2021-04-19 DIAGNOSIS — Z78.9 OTHER SPECIFIED HEALTH STATUS: ICD-10-CM

## 2021-04-19 DIAGNOSIS — E04.1 NONTOXIC SINGLE THYROID NODULE: ICD-10-CM

## 2021-04-19 PROCEDURE — 99072 ADDL SUPL MATRL&STAF TM PHE: CPT

## 2021-04-19 PROCEDURE — 99204 OFFICE O/P NEW MOD 45 MIN: CPT | Mod: 25

## 2021-04-19 PROCEDURE — 31575 DIAGNOSTIC LARYNGOSCOPY: CPT

## 2021-04-19 PROCEDURE — 10005 FNA BX W/US GDN 1ST LES: CPT

## 2021-04-19 NOTE — HISTORY OF PRESENT ILLNESS
[de-identified] : This is a patient referred by Dr. Page for thyroid nodule. This was found recently during physical exam.\par No dysphagia, dysphonia or dyspnea. last sono 4/1/2021 multiple nodules, largest one right up to 3.3 cm. No FNA.\par Patient denies h/o radiation and has no family h/o thyroid cancer.\par

## 2021-04-19 NOTE — CONSULT LETTER
[Dear  ___] : Dear  [unfilled], [Consult Letter:] : I had the pleasure of evaluating your patient, [unfilled]. [Please see my note below.] : Please see my note below. [Consult Closing:] : Thank you very much for allowing me to participate in the care of this patient.  If you have any questions, please do not hesitate to contact me. [Sincerely,] : Sincerely, [FreeTextEntry2] : Dr. Aron Page\par [address]\[place] [id number] [FreeTextEntry3] : Dev

## 2021-04-19 NOTE — PROCEDURE
[FreeTextEntry1] : US FNA R. thyroid nodule [FreeTextEntry2] : R. thyroid nodule [FreeTextEntry3] : After patient gave consent, the lesion was visualized with US with findings noted above.  The overlying skin was prepped with alcohol.  Under US guidance, a FNA was performed with multiple passes of a 22 gauge needle.  The specimen was sent to Cytology.  No hematoma.  Patient tolerated the procedure well.\par  [Gag Reflex] : gag reflex preventing mirror examination [None] : none [Flexible Endoscope] : examined with the flexible endoscope [Serial Number: ___] : Serial Number: [unfilled] [de-identified] : No lesions in the NPx, OPx, HPx or larynx.  VC are mobile, airway patent.\par

## 2021-04-19 NOTE — PHYSICAL EXAM
[de-identified] : Approx. 3 cm R. thyroid nodule, mobile, no TTP, no LAD. [Laryngoscopy Performed] : laryngoscopy was performed, see procedure section for findings [FreeTextEntry1] : No concerning lesions in the OC/OPx on inspection or palpation.\par  [Normal] : no rashes

## 2021-04-19 NOTE — DATA REVIEWED
[de-identified] : US with multinodular thyroid gland with largest nodule in right thyroid lower lobe measuring 2.71 x 2.38 x 3.39 cm.  There is a smaller nodule superior to this measuring 1.24 x 1.12 x 0.73 cm.  All other nodules are subcentimeter.  No LAD.

## 2021-10-18 ENCOUNTER — APPOINTMENT (OUTPATIENT)
Dept: OTOLARYNGOLOGY | Facility: CLINIC | Age: 71
End: 2021-10-18
Payer: MEDICARE

## 2021-10-18 ENCOUNTER — LABORATORY RESULT (OUTPATIENT)
Age: 71
End: 2021-10-18

## 2021-10-18 VITALS
DIASTOLIC BLOOD PRESSURE: 78 MMHG | TEMPERATURE: 98.3 F | HEART RATE: 68 BPM | HEIGHT: 62 IN | BODY MASS INDEX: 34.96 KG/M2 | WEIGHT: 190 LBS | SYSTOLIC BLOOD PRESSURE: 130 MMHG

## 2021-10-18 PROCEDURE — 99214 OFFICE O/P EST MOD 30 MIN: CPT | Mod: 25

## 2021-10-18 PROCEDURE — 10005 FNA BX W/US GDN 1ST LES: CPT

## 2021-10-18 RX ORDER — HYDROCHLOROTHIAZIDE 12.5 MG/1
12.5 TABLET ORAL
Refills: 0 | Status: ACTIVE | COMMUNITY

## 2021-10-18 RX ORDER — IRBESARTAN 150 MG/1
150 TABLET, FILM COATED ORAL
Refills: 0 | Status: ACTIVE | COMMUNITY

## 2021-10-18 NOTE — CONSULT LETTER
[Dear  ___] : Dear  [unfilled], [Courtesy Letter:] : I had the pleasure of seeing your patient, [unfilled], in my office today. [Please see my note below.] : Please see my note below. [Consult Closing:] : Thank you very much for allowing me to participate in the care of this patient.  If you have any questions, please do not hesitate to contact me. [Sincerely,] : Sincerely, [FreeTextEntry2] : Dr. Aron Page\par [address]\[place] [id number] [FreeTextEntry3] : Dev

## 2021-10-18 NOTE — PROCEDURE
[FreeTextEntry1] : US FNA R. superior thyroid nodule [FreeTextEntry2] : R. superior thyroid nodule showing significant progression on US [FreeTextEntry3] : After patient gave consent, the lesion was visualized with US with findings noted above.  The overlying skin was prepped with alcohol.  Under US guidance, a FNA was performed with multiple passes of a 22 gauge needle.  The specimen was sent to Cytology.  No hematoma.  Patient tolerated the procedure well.\par

## 2021-10-18 NOTE — HISTORY OF PRESENT ILLNESS
[de-identified] : This is a patient referred by Dr. Page for thyroid nodule.  Sono 4/1/2021 multiple nodules, largest one right up to 3.3 cm. FNA 4/2021 BENIGN FINDINGS (Category II). pt is here for 6 months f/.u. pt has no c.o. \par Patient denies h/o radiation and has no family h/o thyroid cancer.\par

## 2021-10-18 NOTE — DATA REVIEWED
[de-identified] : US with multinodular thyroid gland with nodule in right thyroid lower lobe measuring 3.06 x 2.03 x 3.53 cm. There is a nodule superior to this measuring 2.49 x 2.01 x 3.62 cm, which is significantly larger than last exam.  All other nodules are subcentimeter. No LAD.

## 2021-11-30 NOTE — PHYSICAL EXAM
Thank you for choosing [facility].  It's a pleasure to be a part of your healthcare team.  Please let us know if you will need anything --- [phone].       You may receive a short survey from [facility] about this visit.  We would appreciate your feedback.    Have a great day!    Rubi ROCHA & Kirsty ROCHA   [Normal] : no rashes [de-identified] : Approx. 3 cm R. thyroid nodule, mobile, no TTP, no LAD. [FreeTextEntry1] : No concerning lesions in the OC/OPx on inspection or palpation.\par

## 2022-01-31 ENCOUNTER — APPOINTMENT (OUTPATIENT)
Dept: OTOLARYNGOLOGY | Facility: CLINIC | Age: 72
End: 2022-01-31
Payer: MEDICARE

## 2022-01-31 VITALS
SYSTOLIC BLOOD PRESSURE: 128 MMHG | HEART RATE: 76 BPM | WEIGHT: 193 LBS | HEIGHT: 62 IN | DIASTOLIC BLOOD PRESSURE: 79 MMHG | BODY MASS INDEX: 35.51 KG/M2

## 2022-01-31 PROCEDURE — 99214 OFFICE O/P EST MOD 30 MIN: CPT | Mod: 25

## 2022-01-31 NOTE — PHYSICAL EXAM
[Normal] : no rashes [de-identified] : Approx. 3 cm R. thyroid nodule, mobile, no TTP, no LAD. [FreeTextEntry1] : No concerning lesions in the OC/OPx on inspection or palpation.\par

## 2022-01-31 NOTE — CONSULT LETTER
[Dear  ___] : Dear  [unfilled], [Courtesy Letter:] : I had the pleasure of seeing your patient, [unfilled], in my office today. [Please see my note below.] : Please see my note below. [Consult Closing:] : Thank you very much for allowing me to participate in the care of this patient.  If you have any questions, please do not hesitate to contact me. [Sincerely,] : Sincerely, [FreeTextEntry2] : Dr. Aron Page\par [address]\[place] [id number] [FreeTextEntry3] : Dev

## 2022-01-31 NOTE — HISTORY OF PRESENT ILLNESS
[de-identified] : 71 year old female follow up for thyroid nodules. Sono 4/1/2021 multiple nodules, largest one right up to 3.3 cm. FNA 4/2021 BENIGN FINDINGS (Category II) US FNA R. Superior thyroid nodule 10/18/21 Cytology BENIGN FINDINGS  (Category II).Nodular goiter. States at times chokes with solids or liquids. Denies throat infections,  dysphagia, odynophagia, dyspnea, dysphonia, fevers, chills, night sweats or otalgia.\par

## 2022-01-31 NOTE — DATA REVIEWED
[de-identified] : US with multinodular thyroid gland with nodule in right thyroid lower lobe measuring 3.11 x 2.12 x 3.80 cm. There is a nodule superior to this measuring 2.63 x 2.07 x 3.82 cm, both are larger than last exam. All other nodules are subcentimeter. No LAD.

## 2022-02-03 ENCOUNTER — OUTPATIENT (OUTPATIENT)
Dept: OUTPATIENT SERVICES | Facility: HOSPITAL | Age: 72
LOS: 1 days | End: 2022-02-03
Payer: MEDICARE

## 2022-02-03 VITALS
OXYGEN SATURATION: 99 % | DIASTOLIC BLOOD PRESSURE: 80 MMHG | HEART RATE: 74 BPM | TEMPERATURE: 98 F | HEIGHT: 62 IN | RESPIRATION RATE: 16 BRPM | WEIGHT: 192.9 LBS | SYSTOLIC BLOOD PRESSURE: 142 MMHG

## 2022-02-03 DIAGNOSIS — E04.2 NONTOXIC MULTINODULAR GOITER: ICD-10-CM

## 2022-02-03 DIAGNOSIS — Z90.710 ACQUIRED ABSENCE OF BOTH CERVIX AND UTERUS: Chronic | ICD-10-CM

## 2022-02-03 DIAGNOSIS — Z98.890 OTHER SPECIFIED POSTPROCEDURAL STATES: Chronic | ICD-10-CM

## 2022-02-03 DIAGNOSIS — E04.9 NONTOXIC GOITER, UNSPECIFIED: ICD-10-CM

## 2022-02-03 DIAGNOSIS — I10 ESSENTIAL (PRIMARY) HYPERTENSION: ICD-10-CM

## 2022-02-03 LAB
ANION GAP SERPL CALC-SCNC: 14 MMOL/L — SIGNIFICANT CHANGE UP (ref 7–14)
BLD GP AB SCN SERPL QL: NEGATIVE — SIGNIFICANT CHANGE UP
BUN SERPL-MCNC: 12 MG/DL — SIGNIFICANT CHANGE UP (ref 7–23)
CALCIUM SERPL-MCNC: 9.5 MG/DL — SIGNIFICANT CHANGE UP (ref 8.4–10.5)
CHLORIDE SERPL-SCNC: 101 MMOL/L — SIGNIFICANT CHANGE UP (ref 98–107)
CO2 SERPL-SCNC: 27 MMOL/L — SIGNIFICANT CHANGE UP (ref 22–31)
CREAT SERPL-MCNC: 0.8 MG/DL — SIGNIFICANT CHANGE UP (ref 0.5–1.3)
GLUCOSE SERPL-MCNC: 78 MG/DL — SIGNIFICANT CHANGE UP (ref 70–99)
HCT VFR BLD CALC: 44.1 % — SIGNIFICANT CHANGE UP (ref 34.5–45)
HGB BLD-MCNC: 14.3 G/DL — SIGNIFICANT CHANGE UP (ref 11.5–15.5)
MCHC RBC-ENTMCNC: 29.4 PG — SIGNIFICANT CHANGE UP (ref 27–34)
MCHC RBC-ENTMCNC: 32.4 GM/DL — SIGNIFICANT CHANGE UP (ref 32–36)
MCV RBC AUTO: 90.7 FL — SIGNIFICANT CHANGE UP (ref 80–100)
NRBC # BLD: 0 /100 WBCS — SIGNIFICANT CHANGE UP
NRBC # FLD: 0 K/UL — SIGNIFICANT CHANGE UP
PLATELET # BLD AUTO: 277 K/UL — SIGNIFICANT CHANGE UP (ref 150–400)
POTASSIUM SERPL-MCNC: 3.9 MMOL/L — SIGNIFICANT CHANGE UP (ref 3.5–5.3)
POTASSIUM SERPL-SCNC: 3.9 MMOL/L — SIGNIFICANT CHANGE UP (ref 3.5–5.3)
RBC # BLD: 4.86 M/UL — SIGNIFICANT CHANGE UP (ref 3.8–5.2)
RBC # FLD: 14.4 % — SIGNIFICANT CHANGE UP (ref 10.3–14.5)
RH IG SCN BLD-IMP: POSITIVE — SIGNIFICANT CHANGE UP
SODIUM SERPL-SCNC: 142 MMOL/L — SIGNIFICANT CHANGE UP (ref 135–145)
WBC # BLD: 6.12 K/UL — SIGNIFICANT CHANGE UP (ref 3.8–10.5)
WBC # FLD AUTO: 6.12 K/UL — SIGNIFICANT CHANGE UP (ref 3.8–10.5)

## 2022-02-03 PROCEDURE — 93010 ELECTROCARDIOGRAM REPORT: CPT

## 2022-02-03 RX ORDER — SODIUM CHLORIDE 9 MG/ML
1000 INJECTION, SOLUTION INTRAVENOUS
Refills: 0 | Status: DISCONTINUED | OUTPATIENT
Start: 2022-02-09 | End: 2022-02-23

## 2022-02-03 NOTE — H&P PST ADULT - ENDOCRINE COMMENTS
thyroid goiter - right side - increasing in size over past year - pt c/o of mild dysphagia - denies pain

## 2022-02-03 NOTE — H&P PST ADULT - PROBLEM SELECTOR PLAN 1
Pt scheduled for surgery Right Hemithyroidectomy with Dr Jacques tentatively 2/9/22 -and preop instructions including instructions for taking Famotidine and for Chlorhexidine use in showering on the day of surgery, given verbally and with use of  written materials, and patient confirming understanding of such instructions using  teach back method.  Pt reviewed with Dr Reinoso and will request LVN from Cardio - Stress and Echo from [facility] in chart

## 2022-02-03 NOTE — H&P PST ADULT - HISTORY OF PRESENT ILLNESS
pt is a 71 yr old female scheduled for   Patient instructed to contact surgeon's office concerning COVID test prior to surgery  pt is a 71 yr old female scheduled for Right Hemithyroidectomy with Dr Jacques tentatively 2/9/22 - pt with thyroid goiter 1 yr ago with mild dysphagia - biopsy benign - pt hx HTN   Patient instructed to contact surgeon's office concerning COVID test prior to surgery

## 2022-02-08 ENCOUNTER — TRANSCRIPTION ENCOUNTER (OUTPATIENT)
Age: 72
End: 2022-02-08

## 2022-02-08 PROBLEM — E66.9 OBESITY, UNSPECIFIED: Chronic | Status: ACTIVE | Noted: 2022-02-03

## 2022-02-08 PROBLEM — E04.9 NONTOXIC GOITER, UNSPECIFIED: Chronic | Status: ACTIVE | Noted: 2022-02-03

## 2022-02-08 NOTE — ASU PATIENT PROFILE, ADULT - PATIENT'S PREFERRED PRONOUN
REVEIVED PT FROM ER, ALERT BUT NOT ORIENTED, NOT ABLE TO ANSWER INTAKE
QUESTIONS. PT IV WNL AND INFUSING NS AND ABT'S. PT MAKES NO COMPLAINTS AT THIS
TIME. WILL CONTINUE WITH INTAKE QUESTIONS IN THE MORNING WITH FAMILY. Her/She

## 2022-02-08 NOTE — ASU PATIENT PROFILE, ADULT - FALL HARM RISK - UNIVERSAL INTERVENTIONS
Bed in lowest position, wheels locked, appropriate side rails in place/Call bell, personal items and telephone in reach/Instruct patient to call for assistance before getting out of bed or chair/Non-slip footwear when patient is out of bed/[place] to call system/Physically safe environment - no spills, clutter or unnecessary equipment/Purposeful Proactive Rounding/Room/bathroom lighting operational, light cord in reach

## 2022-02-09 ENCOUNTER — RESULT REVIEW (OUTPATIENT)
Age: 72
End: 2022-02-09

## 2022-02-09 ENCOUNTER — APPOINTMENT (OUTPATIENT)
Dept: OTOLARYNGOLOGY | Facility: HOSPITAL | Age: 72
End: 2022-02-09

## 2022-02-09 ENCOUNTER — OUTPATIENT (OUTPATIENT)
Dept: OUTPATIENT SERVICES | Facility: HOSPITAL | Age: 72
LOS: 1 days | Discharge: HOME | End: 2022-02-09
Payer: MEDICARE

## 2022-02-09 VITALS
OXYGEN SATURATION: 100 % | HEART RATE: 83 BPM | RESPIRATION RATE: 17 BRPM | SYSTOLIC BLOOD PRESSURE: 121 MMHG | DIASTOLIC BLOOD PRESSURE: 59 MMHG

## 2022-02-09 VITALS
TEMPERATURE: 99 F | SYSTOLIC BLOOD PRESSURE: 142 MMHG | HEART RATE: 78 BPM | OXYGEN SATURATION: 97 % | DIASTOLIC BLOOD PRESSURE: 64 MMHG | RESPIRATION RATE: 14 BRPM | WEIGHT: 192.9 LBS | HEIGHT: 62 IN

## 2022-02-09 DIAGNOSIS — E04.2 NONTOXIC MULTINODULAR GOITER: ICD-10-CM

## 2022-02-09 DIAGNOSIS — Z90.710 ACQUIRED ABSENCE OF BOTH CERVIX AND UTERUS: Chronic | ICD-10-CM

## 2022-02-09 DIAGNOSIS — Z98.890 OTHER SPECIFIED POSTPROCEDURAL STATES: Chronic | ICD-10-CM

## 2022-02-09 PROCEDURE — 60271 REMOVAL OF THYROID: CPT | Mod: GC

## 2022-02-09 PROCEDURE — 88307 TISSUE EXAM BY PATHOLOGIST: CPT | Mod: 26

## 2022-02-09 DEVICE — LIGATING CLIPS WECK HORIZON MEDIUM (BLUE) 24: Type: IMPLANTABLE DEVICE | Status: FUNCTIONAL

## 2022-02-09 DEVICE — LIGATING CLIPS WECK HORIZON SMALL-WIDE (RED) 24: Type: IMPLANTABLE DEVICE | Status: FUNCTIONAL

## 2022-02-09 DEVICE — SURGIFOAM PAD 8CM X 12.5CM X 2MM (100C): Type: IMPLANTABLE DEVICE | Status: FUNCTIONAL

## 2022-02-09 RX ORDER — OXYCODONE HYDROCHLORIDE 5 MG/1
1 TABLET ORAL
Qty: 8 | Refills: 0
Start: 2022-02-09

## 2022-02-09 RX ORDER — ONDANSETRON 8 MG/1
4 TABLET, FILM COATED ORAL ONCE
Refills: 0 | Status: DISCONTINUED | OUTPATIENT
Start: 2022-02-09 | End: 2022-02-23

## 2022-02-09 RX ORDER — FENTANYL CITRATE 50 UG/ML
25 INJECTION INTRAVENOUS
Refills: 0 | Status: DISCONTINUED | OUTPATIENT
Start: 2022-02-09 | End: 2022-02-09

## 2022-02-09 RX ORDER — FENTANYL CITRATE 50 UG/ML
50 INJECTION INTRAVENOUS
Refills: 0 | Status: DISCONTINUED | OUTPATIENT
Start: 2022-02-09 | End: 2022-02-09

## 2022-02-09 RX ORDER — IRBESARTAN 75 MG/1
1 TABLET ORAL
Qty: 0 | Refills: 0 | DISCHARGE

## 2022-02-09 RX ADMIN — SODIUM CHLORIDE 30 MILLILITER(S): 9 INJECTION, SOLUTION INTRAVENOUS at 11:00

## 2022-02-09 NOTE — ASU DISCHARGE PLAN (ADULT/PEDIATRIC) - NS MD DC FALL RISK RISK
For information on Fall & Injury Prevention, visit: [email or web address] OR  [email or web address] OR  https://www.cdc.gov/steadi/patient.html

## 2022-02-09 NOTE — ASU DISCHARGE PLAN (ADULT/PEDIATRIC) - CARE PROVIDER_API CALL
Koby Jacques)  Otolaryngology  [address] [place] [address]  Phone: [phone]  Fax: [phone]  Established Patient  Follow Up Time:

## 2022-02-09 NOTE — ASU DISCHARGE PLAN (ADULT/PEDIATRIC) - MEDICATION INSTRUCTIONS
Take tylenol and alternate with motrin as needed. Supplement with oxycodone if pain not controlled with tylenol or motrin Take Tylenol and alternate with Motrin as needed. Supplement with oxycodone if pain not controlled with Tylenol or Motrin

## 2022-02-14 LAB — SURGICAL PATHOLOGY STUDY: SIGNIFICANT CHANGE UP

## 2022-02-17 ENCOUNTER — APPOINTMENT (OUTPATIENT)
Dept: OTOLARYNGOLOGY | Facility: CLINIC | Age: 72
End: 2022-02-17
Payer: MEDICARE

## 2022-02-17 PROCEDURE — 99024 POSTOP FOLLOW-UP VISIT: CPT

## 2022-02-17 NOTE — HISTORY OF PRESENT ILLNESS
[de-identified] : pt  presents is here for post right thyroid lobectomy and isthmusectomy for goiter on 2/9/2022.  Pt is without any complaints and denies any pain or discomfort, or voice change. Pt also has no neck mass, no difficulty swallowing and no painful swallowing. path c/w Nodular hyperplasia of thyroid. \par \par

## 2022-02-17 NOTE — ASSESSMENT
[FreeTextEntry1] : pt  presents is here for post right thyroid lobectomy and isthmusectomy for goiter on 2/9/2022.  Pt is without any complaints and denies any pain or discomfort, or voice change. Pt also has no neck mass, no difficulty swallowing and no painful swallowing. path c/w Nodular hyperplasia of thyroid. \par \par \par 1. wound care \par 2. sutures removed\par 3. sunblock and Mederma with spf 35 % up to 2-3x a day for 2-3 months\par 4. Path reviewed\par 5. Follow up with Dr. Jacques in 6 weeks and lab script given\par 6. Call the office if worsen of S.S\par \par

## 2022-03-10 NOTE — ASU PATIENT PROFILE, ADULT - NS PRO TALK SOMEONE YN
no
Patient is awake, A&O x 4, NAD, Italian speaking only, sent to ED by PCP for hyperkalemia. He has routine labs done and potassium was elevated. Denies chest pain, SOB, weakness or dizziness. Experiencing running nose and diarrhea for 2 days. Recently diagnosed with cirrhosis and Hep B. Respirations equal and unlabored. Skin intact. Connected to cardiac monitor @sinus rhythm. 20g IV right AC, labs drawn and sent, vitals taken, safety measures in place and will continue to monitor patient.

## 2022-03-29 ENCOUNTER — APPOINTMENT (OUTPATIENT)
Dept: OTOLARYNGOLOGY | Facility: CLINIC | Age: 72
End: 2022-03-29
Payer: MEDICARE

## 2022-03-29 VITALS
HEIGHT: 62 IN | HEART RATE: 76 BPM | WEIGHT: 195 LBS | SYSTOLIC BLOOD PRESSURE: 125 MMHG | OXYGEN SATURATION: 99 % | BODY MASS INDEX: 35.88 KG/M2 | DIASTOLIC BLOOD PRESSURE: 84 MMHG

## 2022-03-29 PROCEDURE — 99024 POSTOP FOLLOW-UP VISIT: CPT

## 2022-03-29 PROCEDURE — 31575 DIAGNOSTIC LARYNGOSCOPY: CPT | Mod: 52,58

## 2022-03-29 NOTE — PROCEDURE
[Gag Reflex] : gag reflex preventing mirror examination [None] : none [Flexible Endoscope] : examined with the flexible endoscope [Serial Number: ___] : Serial Number: [unfilled] [de-identified] : No lesions in the NPx, OPx, HPx or larynx.  VC are mobile, airway patent.\par

## 2022-03-29 NOTE — HISTORY OF PRESENT ILLNESS
[de-identified] : 71 year old female presents for follow-up s/p Right substernal hemithyroidectomy on 02/09/2022. Path c/w Nodular hyperplasia of thyroid. Most recent labs 03/21/2022 Free T3 3.15  Free T4 1.1  TSH 5.75. Patient is without any complaints and denies any pain or discomfort, or voice change. Pt also has no neck mass, no difficulty swallowing and no painful swallowing. \par \par

## 2022-03-29 NOTE — PHYSICAL EXAM
[Laryngoscopy Performed] : laryngoscopy was performed, see procedure section for findings [Normal] : no rashes [de-identified] : Incision healing well.  No LAD. [FreeTextEntry1] : No concerning lesions in the OC/OPx on inspection or palpation.\par

## 2022-03-29 NOTE — CONSULT LETTER
[Dear  ___] : Dear  [unfilled], [Consult Letter:] : I had the pleasure of evaluating your patient, [unfilled]. [Please see my note below.] : Please see my note below. [Consult Closing:] : Thank you very much for allowing me to participate in the care of this patient.  If you have any questions, please do not hesitate to contact me. [Sincerely,] : Sincerely, [FreeTextEntry2] : Dr. Aron Page\par [address]\[place] [id number] [FreeTextEntry3] : Dev

## 2022-03-29 NOTE — REASON FOR VISIT
[Subsequent Evaluation] : a subsequent evaluation for [FreeTextEntry2] : s/p Right substernal hemithyroidectomy on 02/09/2022

## 2022-04-11 LAB
T3FREE SERPL-MCNC: 3.15 PG/ML
T4 FREE SERPL-MCNC: 1.1 NG/DL
TSH SERPL-ACNC: 5.75 UIU/ML

## 2022-05-24 LAB
T3 SERPL-MCNC: 114 NG/DL
T4 SERPL-MCNC: 7.8 UG/DL
TSH SERPL-ACNC: 3.9 UIU/ML

## 2022-06-28 ENCOUNTER — APPOINTMENT (OUTPATIENT)
Dept: OTOLARYNGOLOGY | Facility: CLINIC | Age: 72
End: 2022-06-28

## 2022-06-28 VITALS
WEIGHT: 195 LBS | BODY MASS INDEX: 35.88 KG/M2 | HEIGHT: 62 IN | HEART RATE: 76 BPM | DIASTOLIC BLOOD PRESSURE: 82 MMHG | SYSTOLIC BLOOD PRESSURE: 119 MMHG

## 2022-06-28 PROCEDURE — 99213 OFFICE O/P EST LOW 20 MIN: CPT | Mod: 25

## 2022-06-28 PROCEDURE — 76536 US EXAM OF HEAD AND NECK: CPT

## 2022-06-28 NOTE — HISTORY OF PRESENT ILLNESS
[de-identified] : 72 year old female presents for follow up s/p Right substernal hemithyroidectomy on 02/09/2022. Path c/w Nodular hyperplasia of thyroid. Most recent labs 05/24/22 Free T3 114 Free T4 7.8 TSH 3.90.  States doing well, at times voice becomes raspy. Patient denies dysphagia, odynophagia, dyspnea, night sweats, chills, fevers, or otalgia.

## 2022-06-28 NOTE — REASON FOR VISIT
[Subsequent Evaluation] : a subsequent evaluation for [FreeTextEntry2] : s/p Right substernal hemithyroidectomy on 02/09/2022.

## 2022-06-28 NOTE — DATA REVIEWED
[de-identified] : US today\par No nodularity in the right thyroid bed.\par L thyroid lobe measures 2.60 x 2.16 x 4.99 cm.  There is an upper lobe nodule measuring 0.62 x 0.28 cm.  No other nodules.\par No LAD.

## 2022-06-28 NOTE — PHYSICAL EXAM
[Laryngoscopy Performed] : laryngoscopy was performed, see procedure section for findings [Normal] : no rashes [de-identified] : Incision healing well.  No LAD. [FreeTextEntry1] : No concerning lesions in the OC/OPx on inspection or palpation.\par

## 2022-11-16 NOTE — ED ADULT TRIAGE NOTE - WEIGHT IN KG
Airway  Performed by: Braulio Lemus  Authorized by: Erwin Higuera MD     Final Airway Type:  Supraglottic airway  Final Supraglottic Airway:  Air-Q  SGA Size*:  3.5  Attempts*:  1  Number of Other Approaches Attempted:  0   Patient Identified, Procedure confirmed, Emergency equipment available and Safety protocols followed  Location:  OR  Urgency:  Elective  Difficult Airway: No    Indications for Airway Management:  Anesthesia  Spontaneous Ventilation: absent    Sedation Level:  Anesthetized  MILS Maintained Throughout: No    Mask Difficulty Assessment:  0 - not attempted  Performed By:  SHONA  AA:  Braulio Lemus     85.7

## 2022-12-22 NOTE — ASU DISCHARGE PLAN (ADULT/PEDIATRIC) - MODE OF TRANSPORTATION
Ambulatory Information: Selecting Yes will display possible errors in your note based on the variables you have selected. This validation is only offered as a suggestion for you. PLEASE NOTE THAT THE VALIDATION TEXT WILL BE REMOVED WHEN YOU FINALIZE YOUR NOTE. IF YOU WANT TO FAX A PRELIMINARY NOTE YOU WILL NEED TO TOGGLE THIS TO 'NO' IF YOU DO NOT WANT IT IN YOUR FAXED NOTE.

## 2023-07-18 ENCOUNTER — APPOINTMENT (OUTPATIENT)
Dept: OTOLARYNGOLOGY | Facility: CLINIC | Age: 73
End: 2023-07-18
Payer: MEDICARE

## 2023-07-18 VITALS
HEART RATE: 72 BPM | WEIGHT: 190 LBS | HEIGHT: 62 IN | OXYGEN SATURATION: 96 % | SYSTOLIC BLOOD PRESSURE: 119 MMHG | DIASTOLIC BLOOD PRESSURE: 79 MMHG | BODY MASS INDEX: 34.96 KG/M2

## 2023-07-18 DIAGNOSIS — E04.2 NONTOXIC MULTINODULAR GOITER: ICD-10-CM

## 2023-07-18 PROCEDURE — 99213 OFFICE O/P EST LOW 20 MIN: CPT | Mod: 25

## 2023-07-18 PROCEDURE — 76536 US EXAM OF HEAD AND NECK: CPT

## 2023-07-18 NOTE — DATA REVIEWED
[de-identified] : US today\par No nodularity in the right thyroid bed.\par L thyroid lobe measures 2.6 x 2.2 x 4.8 cm. There is an upper lobe nodule measuring 0.62 x 0.21 cm.  There is another nodule in the midpole measuring 0.52 x 0.32 cm.  No other nodules.\par No LAD.

## 2023-07-18 NOTE — HISTORY OF PRESENT ILLNESS
[de-identified] : 73 year old female presents for follow up s/p Right substernal hemithyroidectomy on 02/09/2022. Path c/w Nodular hyperplasia of thyroid. last labs 05/24/22 Free T3 114 Free T4 7.8 TSH 3.90.  Pt states has blood test done by pcp 1/2023. States doing well, voice stable. Patient denies dysphagia, odynophagia, dyspnea, night sweats, chills, fevers, or otalgia.

## 2023-07-18 NOTE — PHYSICAL EXAM
[Laryngoscopy Performed] : laryngoscopy was performed, see procedure section for findings [Normal] : no rashes [de-identified] : Incision well healed.  No LAD. [FreeTextEntry1] : No concerning lesions in the OC/OPx on inspection or palpation.\par

## 2024-04-24 ENCOUNTER — APPOINTMENT (OUTPATIENT)
Dept: ORTHOPEDIC SURGERY | Facility: CLINIC | Age: 74
End: 2024-04-24
Payer: MEDICARE

## 2024-04-24 DIAGNOSIS — Z00.00 ENCOUNTER FOR GENERAL ADULT MEDICAL EXAMINATION W/OUT ABNORMAL FINDINGS: ICD-10-CM

## 2024-04-24 PROCEDURE — 73610 X-RAY EXAM OF ANKLE: CPT | Mod: LT

## 2024-04-24 PROCEDURE — 99204 OFFICE O/P NEW MOD 45 MIN: CPT

## 2024-04-24 RX ORDER — MELOXICAM 15 MG/1
15 TABLET ORAL
Qty: 30 | Refills: 1 | Status: ACTIVE | COMMUNITY
Start: 2024-04-24 | End: 1900-01-01

## 2024-04-24 NOTE — DISCUSSION/SUMMARY
[de-identified] : WBAT in supportive footwear, spenco arch supports Recommend a course of PT. Ice to affected area. Stretching exercises recommended and demonstrated to patient.  The patient was explained the options as well as benefits of over the counter verses prescription strength nonsteroidal anti-inflammatory medication. The patient opts for a prescription strength medication.

## 2024-04-24 NOTE — HISTORY OF PRESENT ILLNESS
[4] : 4 [Dull/Aching] : dull/aching [de-identified] : Patient is a 74 year old female here for her left ankle. Denies trauma. Reports medial ankle pain since mid-April 2024. WB in sneakers. Her internist recommended orthopedic consult. No formal treatment to date. No previous injury/problem with left ankle. No numbness/tingling reported.  [] : no [FreeTextEntry1] : left ankle

## 2024-04-24 NOTE — PHYSICAL EXAM
[NL (40)] : plantar flexion 40 degrees [NL 30)] : inversion 30 degrees [NL (20)] : eversion 20 degrees [5___] : eversion 5[unfilled]/5 [2+] : posterior tibialis pulse: 2+ [Normal] : saphenous nerve sensation normal [Mild] : mild swelling of medial ankle [4___] : inversion 4[unfilled]/5 [] : non-antalgic [Left] : left ankle [Weight -] : weightbearing [There are no fractures, subluxations or dislocations. No significant abnormalities are seen] : There are no fractures, subluxations or dislocations. No significant abnormalities are seen [FreeTextEntry3] : Medial deviation 2nd toe, symmetrical. Increased pes planovalgus on LT compared to RT.

## 2024-05-24 ENCOUNTER — APPOINTMENT (OUTPATIENT)
Dept: ORTHOPEDIC SURGERY | Facility: CLINIC | Age: 74
End: 2024-05-24
Payer: MEDICARE

## 2024-05-24 DIAGNOSIS — Q66.6 OTHER CONGENITAL VALGUS DEFORMITIES OF FEET: ICD-10-CM

## 2024-05-24 PROCEDURE — 99213 OFFICE O/P EST LOW 20 MIN: CPT

## 2024-05-24 NOTE — DISCUSSION/SUMMARY
[de-identified] : Patient is doing well overall. She will continue with PT. Consistent use of the arch supports were strongly recommended. Icing/nsaids prn.

## 2024-05-24 NOTE — HISTORY OF PRESENT ILLNESS
[4] : 4 [Dull/Aching] : dull/aching [Walking] : walking [de-identified] : Patient returns for her left posterior tibial tendonitis.  Reports medial ankle pain since mid-April 2024. WB in sneakers. Since last visit she has been going to PT.  She uses her arch supports intermittently, and has not taken the meloxicam.  She reports some improvement, but still has some pain with walking. [] : no [FreeTextEntry1] : left ankle

## 2024-05-24 NOTE — PHYSICAL EXAM
[Mild] : mild swelling of medial ankle [NL (40)] : plantar flexion 40 degrees [NL 30)] : inversion 30 degrees [NL (20)] : eversion 20 degrees [4___] : inversion 4[unfilled]/5 [2+] : posterior tibialis pulse: 2+ [Normal] : saphenous nerve sensation normal [Left] : left ankle [Weight -] : weightbearing [There are no fractures, subluxations or dislocations. No significant abnormalities are seen] : There are no fractures, subluxations or dislocations. No significant abnormalities are seen [5___] : plantar flexion 5[unfilled]/5 [] : non-antalgic [FreeTextEntry3] : Medial deviation 2nd toe, symmetrical. Increased pes planovalgus on LT compared to RT.

## 2024-06-21 ENCOUNTER — APPOINTMENT (OUTPATIENT)
Dept: ORTHOPEDIC SURGERY | Facility: CLINIC | Age: 74
End: 2024-06-21
Payer: MEDICARE

## 2024-06-21 DIAGNOSIS — M76.822 POSTERIOR TIBIAL TENDINITIS, LEFT LEG: ICD-10-CM

## 2024-06-21 PROCEDURE — L4350: CPT | Mod: LT

## 2024-06-21 PROCEDURE — 99213 OFFICE O/P EST LOW 20 MIN: CPT

## 2024-06-21 NOTE — PHYSICAL EXAM
[Left] : left foot and ankle [Mild] : mild swelling of medial ankle [NL (40)] : plantar flexion 40 degrees [NL 30)] : inversion 30 degrees [NL (20)] : eversion 20 degrees [4___] : inversion 4[unfilled]/5 [5___] : eversion 5[unfilled]/5 [2+] : posterior tibialis pulse: 2+ [Normal] : saphenous nerve sensation normal [] : non-antalgic [FreeTextEntry3] : Medial deviation 2nd toe, symmetrical. Increased pes planovalgus on LT compared to RT.

## 2024-06-21 NOTE — DISCUSSION/SUMMARY
[de-identified] : Patient continues to have pain.  MRI ordered to evaluate for a posterior tibial tendon partial tear. Continue with daily icing and meloxicam. Airlift brace recommended and provided today.

## 2024-07-01 ENCOUNTER — APPOINTMENT (OUTPATIENT)
Dept: MRI IMAGING | Facility: CLINIC | Age: 74
End: 2024-07-01
Payer: MEDICARE

## 2024-07-01 PROCEDURE — 73721 MRI JNT OF LWR EXTRE W/O DYE: CPT | Mod: LT

## 2024-07-05 ENCOUNTER — APPOINTMENT (OUTPATIENT)
Dept: ORTHOPEDIC SURGERY | Facility: CLINIC | Age: 74
End: 2024-07-05
Payer: MEDICARE

## 2024-07-05 DIAGNOSIS — M76.822 POSTERIOR TIBIAL TENDINITIS, LEFT LEG: ICD-10-CM

## 2024-07-05 PROCEDURE — 99214 OFFICE O/P EST MOD 30 MIN: CPT

## 2024-07-05 RX ORDER — CELECOXIB 200 MG/1
200 CAPSULE ORAL DAILY
Qty: 30 | Refills: 1 | Status: ACTIVE | COMMUNITY
Start: 2024-07-05 | End: 1900-01-01

## 2024-07-15 ENCOUNTER — NON-APPOINTMENT (OUTPATIENT)
Age: 74
End: 2024-07-15

## 2024-07-16 ENCOUNTER — APPOINTMENT (OUTPATIENT)
Dept: OTOLARYNGOLOGY | Facility: CLINIC | Age: 74
End: 2024-07-16
Payer: MEDICARE

## 2024-07-16 VITALS
HEART RATE: 69 BPM | BODY MASS INDEX: 35.88 KG/M2 | HEIGHT: 62 IN | DIASTOLIC BLOOD PRESSURE: 80 MMHG | OXYGEN SATURATION: 96 % | WEIGHT: 195 LBS | SYSTOLIC BLOOD PRESSURE: 119 MMHG

## 2024-07-16 DIAGNOSIS — E04.2 NONTOXIC MULTINODULAR GOITER: ICD-10-CM

## 2024-07-16 PROCEDURE — 76536 US EXAM OF HEAD AND NECK: CPT

## 2024-07-16 PROCEDURE — 99213 OFFICE O/P EST LOW 20 MIN: CPT

## 2024-08-12 ENCOUNTER — EMERGENCY (EMERGENCY)
Facility: HOSPITAL | Age: 74
LOS: 0 days | Discharge: HOME | End: 2024-08-12
Attending: EMERGENCY MEDICINE
Payer: COMMERCIAL

## 2024-08-12 VITALS
WEIGHT: 199.96 LBS | TEMPERATURE: 99 F | RESPIRATION RATE: 16 BRPM | SYSTOLIC BLOOD PRESSURE: 137 MMHG | OXYGEN SATURATION: 98 % | HEART RATE: 79 BPM | DIASTOLIC BLOOD PRESSURE: 79 MMHG | HEIGHT: 62 IN

## 2024-08-12 VITALS
DIASTOLIC BLOOD PRESSURE: 85 MMHG | HEART RATE: 65 BPM | SYSTOLIC BLOOD PRESSURE: 128 MMHG | TEMPERATURE: 98 F | OXYGEN SATURATION: 97 % | RESPIRATION RATE: 17 BRPM

## 2024-08-12 DIAGNOSIS — Z88.0 ALLERGY STATUS TO PENICILLIN: ICD-10-CM

## 2024-08-12 DIAGNOSIS — Z98.890 OTHER SPECIFIED POSTPROCEDURAL STATES: Chronic | ICD-10-CM
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
## 2024-08-12 DIAGNOSIS — T16.1XXA FOREIGN BODY IN RIGHT EAR, INITIAL ENCOUNTER: ICD-10-CM
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
## 2024-08-12 DIAGNOSIS — Z90.710 ACQUIRED ABSENCE OF BOTH CERVIX AND UTERUS: Chronic | ICD-10-CM
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
## 2024-08-12 PROCEDURE — 99283 EMERGENCY DEPT VISIT LOW MDM: CPT | Mod: 25

## 2024-08-12 PROCEDURE — 69200 CLEAR OUTER EAR CANAL: CPT | Mod: RT

## 2024-08-12 NOTE — ED PROVIDER NOTE - PATIENT PORTAL LINK FT
You can access the FollowMyHealth Patient Portal offered by [facility] by registering at the following website: [email or web address]. By joining [employer]’s FollowMyHealth portal, you will also be able to view your health information using other applications (apps) compatible with our system.

## 2024-08-12 NOTE — ED ADULT NURSE NOTE - OBJECTIVE STATEMENT
Pt presents to ED A&Ox3 c/o part of earbud being stuck in R ear since Saturday. Pt reports going to a concert and after taking earbuds out she noticed a part was missing. Pt was seen at urgent car today and was told by MD to come to ED because the required tools were not available. Pt deneis any discomfort Pt presents to ED A&Ox3 c/o part of earbud being stuck in R ear since Saturday. Pt reports going to a concert and after taking earbuds out she noticed a part was missing. Pt was seen at urgent car today and was told by MD to come to ED because the required tools were not available. Pt denies any discomfort

## 2024-08-12 NOTE — ED ADULT TRIAGE NOTE - CHIEF COMPLAINT QUOTE
Pt states, "an ear bud stuck in my ear, right ear" x Saturday. Pt states she was sent to ER by [facility] because they didn't' have the tools to remove ear bud.

## 2024-08-12 NOTE — ED ADULT NURSE NOTE - HISTORY OF COVID-19 VACCINATION
No Bill For Surgical Tray: no Render Path Notes In Note?: Yes Epidermal Sutures: 5-0 Nylon Dressing: bandage Size Of Lesion In Cm (Optional): 0 Suture Removal: 14 days Punch Size In Mm: 6 Post-Care Instructions: Clean with soap and water. Anesthesia Type: 1% lidocaine with 1:200,000 epinephrine Yes Biopsy Type: H and E Wound Care: Polysporin ointment Information: Selecting Yes will display possible errors in your note based on the variables you have selected. This validation is only offered as a suggestion for you. PLEASE NOTE THAT THE VALIDATION TEXT WILL BE REMOVED WHEN YOU FINALIZE YOUR NOTE. IF YOU WANT TO FAX A PRELIMINARY NOTE YOU WILL NEED TO TOGGLE THIS TO 'NO' IF YOU DO NOT WANT IT IN YOUR FAXED NOTE. Detail Level: Detailed Billing Type: Third-Party Bill Anesthesia Volume In Cc: 1 Home Suture Removal Text: Patient was provided a home suture removal kit and will remove their sutures at home.  If they have any questions or difficulties they will call the office. Hemostasis: Pressure Path Notes (To The Dermatopathologist): Pink eroded papule, ? Skin cancer.

## 2024-08-12 NOTE — ED PROVIDER NOTE - NSFOLLOWUPINSTRUCTIONS_ED_ALL_ED_FT
The foreign body was successfully removed from your right ear. Follow up with your primary care physician as needed. Return to the ER for chest pain, passing out or any other concerning symptoms.

## 2024-08-12 NOTE — ED ADULT NURSE NOTE - CHIEF COMPLAINT QUOTE
Pt states, "an ear bud stuck in my ear, right ear" x Saturday. Pt states she was sent to ER by [facility] because they didn't' have the tools to remove ear bud.

## 2024-08-12 NOTE — ED PROVIDER NOTE - OBJECTIVE STATEMENT
On saturday the patient noted that an ear bud became stuck in her right ear. It is painless and her hearing is unchanged. She went to her primary care but they don't have the tools to remove it so she was referred her.

## 2024-08-12 NOTE — ED PROVIDER NOTE - ENMT, MLM
Airway patent, Nasal mucosa clear. Mouth with normal mucosa. Throat has no vesicles, no oropharyngeal exudates and uvula is midline. There is a foreign body in the R ear, L TM is clear.

## 2024-08-16 ENCOUNTER — APPOINTMENT (OUTPATIENT)
Dept: ORTHOPEDIC SURGERY | Facility: CLINIC | Age: 74
End: 2024-08-16

## 2025-04-25 NOTE — ASU DISCHARGE PLAN (ADULT/PEDIATRIC) - DISCHARGE PLAN IS COMPLETE AND GIVEN TO PATIENT
: Yes PAST MEDICAL HISTORY:  HLD (hyperlipidemia)     Unilateral primary osteoarthritis, left hip

## (undated) DEVICE — SOL IRR POUR NS 0.9% 500ML

## (undated) DEVICE — SUT VICRYL 3-0 27" SH UNDYED

## (undated) DEVICE — SUT SILK 2-0 18" FS

## (undated) DEVICE — RUBBER BANDS STERILE

## (undated) DEVICE — SPONGE PEANUT AUTO COUNT

## (undated) DEVICE — SUT SILK 2-0 30" SH

## (undated) DEVICE — STAPLER SKIN VISI-STAT 35 WIDE

## (undated) DEVICE — DRAIN RESERVOIR FOR JACKSON PRATT 100CC CARDINAL

## (undated) DEVICE — DRSG BENZOIN 0.6CC

## (undated) DEVICE — DRSG CURITY GAUZE SPONGE 4 X 4" 12-PLY

## (undated) DEVICE — DRAPE SPLIT SHEET 77" X 120"

## (undated) DEVICE — CANISTER DISPOSABLE THIN WALL 3000CC

## (undated) DEVICE — DRAPE TOWEL BLUE 17" X 24"

## (undated) DEVICE — DRAPE MAGNETIC INSTRUMENT MEDIUM

## (undated) DEVICE — SOL IRR POUR H2O 500ML

## (undated) DEVICE — DRSG TEGADERM 4X4.75"

## (undated) DEVICE — BIPOLAR FORCEP KIRWAN JEWELERS STR 4" X 0.4MM W 12FT CORD (GREEN)

## (undated) DEVICE — STIM SURG NERVE CHECKPOINT

## (undated) DEVICE — PREP BETADINE SPONGE STICKS

## (undated) DEVICE — TONSIL ROLLS

## (undated) DEVICE — DRSG STERISTRIPS 0.5 X 4"

## (undated) DEVICE — PACK HEAD & NECK

## (undated) DEVICE — DRAPE INSTRUMENT POUCH 6.75" X 11"

## (undated) DEVICE — DRAPE 3/4 SHEET 52X76"

## (undated) DEVICE — VENODYNE/SCD SLEEVE CALF MEDIUM

## (undated) DEVICE — SUT BIOSYN 4-0 18" P-12

## (undated) DEVICE — LABELS BLANK W PEN

## (undated) DEVICE — GLV 8.5 PROTEXIS

## (undated) DEVICE — GLV 8 PROTEXIS (WHITE)

## (undated) DEVICE — ELCTR BOVIE PENCIL SMOKE EVACUATION

## (undated) DEVICE — WARMING BLANKET LOWER ADULT

## (undated) DEVICE — ELCTR GROUNDING PAD ADULT COVIDIEN

## (undated) DEVICE — DRAIN BLAKE 10FR ROUND